# Patient Record
Sex: FEMALE | Race: WHITE | Employment: FULL TIME | URBAN - METROPOLITAN AREA
[De-identification: names, ages, dates, MRNs, and addresses within clinical notes are randomized per-mention and may not be internally consistent; named-entity substitution may affect disease eponyms.]

---

## 2018-09-17 ENCOUNTER — TELEPHONE (OUTPATIENT)
Dept: PULMONOLOGY | Age: 38
End: 2018-09-17

## 2022-02-14 ENCOUNTER — HOSPITAL ENCOUNTER (OUTPATIENT)
Age: 42
Discharge: HOME | End: 2022-02-14
Payer: COMMERCIAL

## 2022-02-14 PROCEDURE — 83550 IRON BINDING TEST: CPT

## 2022-02-14 PROCEDURE — 84443 ASSAY THYROID STIM HORMONE: CPT

## 2022-02-14 PROCEDURE — 84439 ASSAY OF FREE THYROXINE: CPT

## 2022-02-14 PROCEDURE — 83540 ASSAY OF IRON: CPT

## 2022-02-15 LAB
IRON SATURATION: 18 % (ref 15–50)
IRON: 65 UG/DL (ref 37–145)
T4 FREE: 1 NG/DL (ref 0.9–1.8)
TOTAL IRON BINDING CAPACITY: 353 UG/DL (ref 260–445)
TSH SERPL DL<=0.05 MIU/L-ACNC: 2.24 UIU/ML (ref 0.27–4.2)

## 2022-03-02 ENCOUNTER — HOSPITAL ENCOUNTER (OUTPATIENT)
Dept: NEUROLOGY | Age: 42
Discharge: HOME | End: 2022-03-02
Payer: COMMERCIAL

## 2022-03-02 PROCEDURE — 95910 NRV CNDJ TEST 7-8 STUDIES: CPT

## 2022-03-02 PROCEDURE — 95886 MUSC TEST DONE W/N TEST COMP: CPT

## 2022-03-02 NOTE — PROCEDURES
Test Date:  3/2/2022    Patient: Jacey Galvez : 1980 Physician: Nahed Prabhakar DO   Sex: Female ID#:  Ref Phys: Marcella Alba MD     Patient Complaints:  Patient is a 42year-old female who presents with numbness tingling pain in the hands Onset prior to     Patient History / Exam:  PMH: no endocrine disease. no neck or arm surgery PE: reflexes trace, normal strength    NCV & EMG Findings:  Evaluation of the left median (APB) motor nerve showed reduced amplitude (4.5 mV). The right median (APB) motor nerve showed prolonged distal onset latency (4.7 ms) and reduced amplitude (3.2 mV). The left median sensory and the right median sensory nerves showed prolonged distal peak latency (L3.7, R4.1 ms) and decreased conduction velocity (L38, R34 m/s). All remaining nerves (as indicated in the following tables) were within normal limits. All examined muscles (as indicated in the following table) showed no evidence of electrical instability. Impression: Study is consistent with bilateral carpal tunnel syndrome, right moderate severity left mild severity. No evidence of an acute radiculopathy or other entrapment neuropathy.             Nahed Prabhakar DO        Nerve Conduction Studies  Motor Nerve Results      Latency Amplitude F-Lat Segment Distance CV Comment   Site (ms) Norm (mV) Norm (ms)  (cm) (m/s) Norm    Left Median (APB) Motor   Wrist 3.7  < 4.2 4.5  > 5.0         Elbow 7.6 - 4.4 -  Elbow-Wrist 21 54  > 50    Right Median (APB) Motor   Wrist 4.7  < 4.2 3.2  > 5.0         Elbow 7.7 - 3.2 -  Elbow-Wrist 18 60  > 50    Left Ulnar (ADM) Motor   Wrist 3.4  < 4.2 4.4  > 3.0         Bel Elbow 6.4 - 3.6 -  Bel Elbow-Wrist 20 67  > 50    Abv Elbow 7.7 - 3.8 -  Abv Elbow-Bel Elbow 7 54  > 48    Right Ulnar (ADM) Motor   Wrist 3.2  < 4.2 7.4  > 3.0         Bel Elbow 6.4 - 7.0 -  Bel Elbow-Wrist 22 69  > 50    Abv Elbow 7.5 - 7.2 -  Abv Elbow-Bel Elbow 6 55  > 48      Sensory Nerve Results Latency (Peak) Amplitude (P-P) Segment Distance CV Comment   Site (ms) Norm (µV) Norm  (cm) (m/s) Norm    Left Median Sensory   Wrist-Dig II 3.7  < 3.6 47  > 10 Wrist-Dig II 14 38  > 39    Right Median Sensory   Wrist-Dig II 4.1  < 3.6 42  > 10 Wrist-Dig II 14 34  > 39    Left Ulnar Sensory   Wrist-Dig V 3.0  < 3.7 33  > 15 Wrist-Dig V 14 47  > 38    Right Ulnar Sensory   Wrist-Dig V 3.4  < 3.7 58  > 15 Wrist-Dig V 14 41  > 38        Electromyography     Side Muscle Nerve Root Ins Act Fibs Psw Amp Dur Poly Recrt Int Camilla Deems Comment   Right Deltoid Axillary C5-C6 Nml Nml Nml Nml Nml 0 Nml Nml    Right Biceps Musculocut C5-C6 Nml Nml Nml Nml Nml 0 Nml Nml    Right Triceps Radial C6-C8 Nml Nml Nml Nml Nml 0 Nml Nml    Right Brachiorad Radial C5-C6 Nml Nml Nml Nml Nml 0 Nml Nml    Right Pronator Teres Median C6-C7 Nml Nml Nml Nml Nml 0 Nml Nml    Right EIP Post Interosseous,  R... C7-C8 Nml Nml Nml Nml Nml 0 Nml Nml    Right APB Median C8-T1 Nml Nml Nml Nml Nml 0 Nml Nml    Right FDI Ulnar C8-T1 Nml Nml Nml Nml Nml 0 Nml Nml    Right Cervical Paraspinal (Uppe. .. Rami C1-C3 Nml Nml Nml         Right Cervical Paraspinal (Mid) Rami C4-C6 Nml Nml Nml         Right Cervical Paraspinal (Jose Bibb. .. Rami C7-C8 Nml Nml Nml         Left Deltoid Axillary C5-C6 Nml Nml Nml Nml Nml 0 Nml Nml    Left Biceps Musculocut C5-C6 Nml Nml Nml Nml Nml 0 Nml Nml    Left Triceps Radial C6-C8 Nml Nml Nml Nml Nml 0 Nml Nml    Left Brachiorad Radial C5-C6 Nml Nml Nml Nml Nml 0 Nml Nml    Left Pronator Teres Median C6-C7 Nml Nml Nml Nml Nml 0 Nml Nml    Left EIP Post Interosseous,  R... C7-C8 Nml Nml Nml Nml Nml 0 Nml Nml    Left APB Median C8-T1 Nml Nml Nml Nml Nml 0 Nml Nml    Left FDI Ulnar C8-T1 Nml Nml Nml Nml Nml 0 Nml Nml    Left Cervical Paraspinal (Uppe. .. Rami C1-C3 Nml Nml Nml         Left Cervical Paraspinal (Mid) Rami C4-C6 Nml Nml Nml         Left Cervical Paraspinal (Jose Bibb. ..  Rami C7-C8 Nml Nml Nml             Electronically signed by Abbi Ledesma,  on 3/2/2022 at 10:09 AM

## 2022-09-30 ENCOUNTER — OFFICE VISIT (OUTPATIENT)
Dept: ENT CLINIC | Age: 42
End: 2022-09-30
Payer: COMMERCIAL

## 2022-09-30 VITALS — HEIGHT: 64 IN | WEIGHT: 170 LBS | TEMPERATURE: 97.8 F | BODY MASS INDEX: 29.02 KG/M2 | RESPIRATION RATE: 16 BRPM

## 2022-09-30 DIAGNOSIS — G47.33 OSA (OBSTRUCTIVE SLEEP APNEA): Primary | ICD-10-CM

## 2022-09-30 DIAGNOSIS — R09.82 POST-NASAL DRAINAGE: ICD-10-CM

## 2022-09-30 PROCEDURE — 99203 OFFICE O/P NEW LOW 30 MIN: CPT | Performed by: OTOLARYNGOLOGY

## 2022-09-30 RX ORDER — CYCLOBENZAPRINE HCL 10 MG
TABLET ORAL
COMMUNITY
Start: 2022-09-28

## 2022-09-30 RX ORDER — TRAZODONE HYDROCHLORIDE 50 MG/1
TABLET ORAL
COMMUNITY
Start: 2022-09-01

## 2022-09-30 RX ORDER — ALPRAZOLAM 0.5 MG/1
TABLET ORAL
COMMUNITY

## 2022-09-30 RX ORDER — METHYLPREDNISOLONE 4 MG/1
TABLET ORAL
COMMUNITY
Start: 2022-09-28

## 2022-09-30 RX ORDER — VALACYCLOVIR HYDROCHLORIDE 1 G/1
TABLET, FILM COATED ORAL
COMMUNITY
Start: 2022-07-29

## 2022-09-30 RX ORDER — BUPROPION HYDROCHLORIDE 300 MG/1
TABLET ORAL
COMMUNITY
Start: 2022-07-16

## 2022-09-30 RX ORDER — DICLOFENAC SODIUM 75 MG/1
TABLET, DELAYED RELEASE ORAL
COMMUNITY
Start: 2022-09-28

## 2022-09-30 RX ORDER — BREXPIPRAZOLE 1 MG/1
TABLET ORAL
COMMUNITY
Start: 2022-09-28

## 2022-09-30 ASSESSMENT — ENCOUNTER SYMPTOMS
EYE ITCHING: 0
FACIAL SWELLING: 0
VOICE CHANGE: 0
SINUS PRESSURE: 0
COUGH: 0
SORE THROAT: 0
TROUBLE SWALLOWING: 0
APNEA: 0
SHORTNESS OF BREATH: 0

## 2022-09-30 NOTE — PROGRESS NOTES
Doug Dominguez [address], [address], [address] [place] [address]  P: [phone]       Patient     Junita Nissen  1980    ChiefComplaint     Chief Complaint   Patient presents with    New Patient     Patient here to today to discuss Inspire- States that she has had tonsillectomy, and sinus surgery that she is concerned may still have issues. History of Present Illness     Lin Hillman is a 25-year-old female here today for evaluation of obstructive sleep apnea and discussion of inspire. Notes moderate obstructive sleep apnea on sleep study done 6 months ago AHI of 19. Previous sleep study the year prior showed an AHI of 30. Had a CPAP titration study which showed she was fully treated but states she does not feel any benefit from using her CPAP. Significant fatigue states it is frequently falling off and overall she feels mildly claustrophobic with in place. Is seeking alternative. Past Medical History     History reviewed. No pertinent past medical history. Past Surgical History     Past Surgical History:   Procedure Laterality Date    SINUS SURGERY      TONSILLECTOMY         Family History     History reviewed. No pertinent family history.     Social History     Social History     Tobacco Use    Smoking status: Some Days     Packs/day: 0.25     Types: Cigarettes    Smokeless tobacco: Never    Tobacco comments:     Smokes about a pack every two weeks   Vaping Use    Vaping Use: Never used   Substance Use Topics    Alcohol use: Yes     Comment: Socially    Drug use: Never        Allergies     No Known Allergies    Medications     Current Outpatient Medications   Medication Sig Dispense Refill    buPROPion (WELLBUTRIN XL) 300 MG extended release tablet TAKE 1 TABLET BY MOUTH EVERY DAY      REXULTI 1 MG TABS tablet       methylPREDNISolone (MEDROL DOSEPACK) 4 MG tablet       diclofenac (VOLTAREN) 75 MG EC tablet       ALPRAZolam (XANAX) 0.5 MG tablet alprazolam 0.5 mg tablet   TAKE Mouth/Throat:      Lips: Pink. Mouth: Mucous membranes are moist.      Tongue: No lesions. Palate: No mass. Pharynx: Uvula midline. Comments: Status post tonsillectomy  Eyes:      Pupils: Pupils are equal, round, and reactive to light. Neck:      Thyroid: No thyroid mass or thyromegaly. Trachea: Trachea and phonation normal.   Cardiovascular:      Pulses: Normal pulses. Pulmonary:      Effort: Pulmonary effort is normal. No accessory muscle usage or respiratory distress. Breath sounds: No stridor. Musculoskeletal:      Cervical back: Full passive range of motion without pain. Lymphadenopathy:      Head:      Right side of head: No submental or submandibular adenopathy. Left side of head: No submental or submandibular adenopathy. Cervical: No cervical adenopathy. Right cervical: No superficial, deep or posterior cervical adenopathy. Left cervical: No superficial, deep or posterior cervical adenopathy. Skin:     General: Skin is warm and dry. Neurological:      Mental Status: She is alert and oriented to person, place, and time. Cranial Nerves: No cranial nerve deficit. Coordination: Coordination normal.      Gait: Gait normal.   Psychiatric:         Thought Content: Thought content normal.         Assessment and Plan     1. DESTINY (obstructive sleep apnea)  -Reports AHI of 19 on sleep study 6 months ago has tried and failed CPAP for greater than 3 months, BMI 29  -Records release signed we will obtain sleep study results  -Inspire process and pathway discussed understands need for sleep endoscopy to determine if she is a candidate    2. Post-nasal drainage  -Start rinsing nose, Provided          Iliana Mullins DO  9/30/22      Portions of this note were dictated using Dragon.  There may be linguistic errors secondary to the use of this program.

## 2022-09-30 NOTE — LETTER
[facility]    Surgery Schedule Request Form: 09/30/22  [address]    DATE OF SURGERY: 11/14/2022    TIME OF SURGERY:  8:30             CONF #: ____________________       Patient Information:    Patient name: Aarti Capellan    YOB: 1980 Age/Sex:41 y.o./female    SS #:[id number]    Wt Readings from Last 1 Encounters:   09/30/22 170 lb (77.1 kg)       Telephone Information:   Mobile [phone]     Home [phone]     Surgeon & Procedure Information:     Lead surgeon: Elayne Marrero Co-Surgeon: brandt  Phone: [phone] Fax: [phone]  PCP: GERADR Salcido CNP    Diagnosis: obstructive sleep apena  G47.33    Location: [address] [place] [address]    Procedure name/CPT: drug induced sleep endoscopy   78168    Procedure length: 20 minutes Anesthesia: Endo Sedation    Special Equipment: no    Patient Status: SDS (OP)    COVID Vacs: yes / no     Primary Payor Plan: [employer]  Member ID: [id number]   [facility] [address] name: aArti Capellan    [] Implement attached clinical orders for patient.       Electronically signed by Triny Maynard DO on 9/30/2022 at 11:28 AM

## 2022-11-04 NOTE — PROGRESS NOTES
Kim Fermo    Age 42 y.o.    female    1980    MRN [id number]    11/14/2022  Arrival Time_____________  OR Time____________30 [address] Saver     Procedure(s):  DRUG INDUCED SLEEP ENDOSCOPY                      Monitor Anesthesia Care    Surgeon(s):  Wayne Dixon, DO       Phone [phone] ([place])     [address]  Cell         Work  _____________________________________________________________________  _____________________________________________________________________  _____________________________________________________________________  _____________________________________________________________________  _____________________________________________________________________    PCP _____________________________ Phone_________________     H&P__________________Bringing      Chart            Epic   DOS      Called________  EKG__________________Bringing      Chart            Epic   DOS      Called________  LAB__________________ Bringing      Chart            Epic   DOS      Called________  Cardiac Clearance_______Bringing      Chart            Epic      DOS      Called________    Cardiologist________________________ Phone___________________________    ? Baptism concerns / Waiver on Chart            [employer]________________  ? Pre-op Instructions Given [place]          _________________________________  ? Directions to Surgery Center                          _________________________________  ? Transportation Home_______________      __________________________________  ?  Crutches/Walker__________________        __________________________________    ________Pre-op Orders   _______Transcribed    _______Wt.  ________Pharmacy          _______SCD  ______VTE     ______TED Rudolfo Speaker  _______  Surgery Consent    _______  Anesthesia Consent         COVID DATE______________LOCATION________________ RESULT__________

## 2022-11-09 NOTE — PROGRESS NOTES

## 2022-11-09 NOTE — PROGRESS NOTES
Obstructive Sleep Apnea (DESTINY) Screening     Patient:  Juliet Hook    YOB: 1980      Medical Record #:  [id number]                     Date:  11/9/2022     1. Are you a loud and/or regular snorer? []  Yes       [x] No    2. Have you been observed to gasp or stop breathing during sleep? []  Yes       [x] No    3. Do you feel tired or groggy upon awakening or do you awaken with a headache?           []  Yes       [x] No    4. Are you often tired or fatigued during the wake time hours? []  Yes       [x] No    5. Do you fall asleep sitting, reading, watching TV or driving? []  Yes       [] No    6. Do you often have problems with memory or concentration? []  Yes       [] No    **If patient's score is ? 3 they are considered high risk for DESTINY. An Anesthesia provider will evaluate the patient and develop a plan of care the day of surgery. Note:  If the patient's BMI is more than 35 kg m¯² , has neck circumference > 40 cm, and/or high blood pressure the risk is greater (© American Sleep Apnea Association, 2006).

## 2022-11-11 ENCOUNTER — ANESTHESIA EVENT (OUTPATIENT)
Dept: OPERATING ROOM | Age: 42
End: 2022-11-11
Payer: COMMERCIAL

## 2022-11-14 ENCOUNTER — HOSPITAL ENCOUNTER (OUTPATIENT)
Age: 42
Setting detail: OUTPATIENT SURGERY
Discharge: HOME | End: 2022-11-14
Attending: OTOLARYNGOLOGY | Admitting: OTOLARYNGOLOGY
Payer: COMMERCIAL

## 2022-11-14 ENCOUNTER — ANESTHESIA (OUTPATIENT)
Dept: OPERATING ROOM | Age: 42
End: 2022-11-14
Payer: COMMERCIAL

## 2022-11-14 VITALS
BODY MASS INDEX: 29.02 KG/M2 | TEMPERATURE: 97 F | HEIGHT: 64 IN | OXYGEN SATURATION: 97 % | SYSTOLIC BLOOD PRESSURE: 119 MMHG | DIASTOLIC BLOOD PRESSURE: 81 MMHG | RESPIRATION RATE: 16 BRPM | HEART RATE: 75 BPM | WEIGHT: 170 LBS

## 2022-11-14 PROBLEM — G47.33 OBSTRUCTIVE SLEEP APNEA (ADULT) (PEDIATRIC): Status: ACTIVE | Noted: 2022-11-14

## 2022-11-14 LAB — PREGNANCY, URINE: NEGATIVE

## 2022-11-14 PROCEDURE — 3600000013 HC SURGERY LEVEL 3 ADDTL 15MIN: Performed by: OTOLARYNGOLOGY

## 2022-11-14 PROCEDURE — 3600000003 HC SURGERY LEVEL 3 BASE: Performed by: OTOLARYNGOLOGY

## 2022-11-14 PROCEDURE — 6370000000 HC RX 637 (ALT 250 FOR IP): Performed by: OTOLARYNGOLOGY

## 2022-11-14 PROCEDURE — 2580000003 HC RX 258

## 2022-11-14 PROCEDURE — 3700000000 HC ANESTHESIA ATTENDED CARE: Performed by: OTOLARYNGOLOGY

## 2022-11-14 PROCEDURE — 2500000003 HC RX 250 WO HCPCS: Performed by: OTOLARYNGOLOGY

## 2022-11-14 PROCEDURE — 6360000002 HC RX W HCPCS

## 2022-11-14 PROCEDURE — 3700000001 HC ADD 15 MINUTES (ANESTHESIA): Performed by: OTOLARYNGOLOGY

## 2022-11-14 PROCEDURE — 84703 CHORIONIC GONADOTROPIN ASSAY: CPT

## 2022-11-14 PROCEDURE — 2580000003 HC RX 258: Performed by: ANESTHESIOLOGY

## 2022-11-14 PROCEDURE — 2709999900 HC NON-CHARGEABLE SUPPLY: Performed by: OTOLARYNGOLOGY

## 2022-11-14 PROCEDURE — 42975 DISE EVAL SLP DO BRTH FLX DX: CPT | Performed by: OTOLARYNGOLOGY

## 2022-11-14 PROCEDURE — 7100000010 HC PHASE II RECOVERY - FIRST 15 MIN: Performed by: OTOLARYNGOLOGY

## 2022-11-14 PROCEDURE — 7100000011 HC PHASE II RECOVERY - ADDTL 15 MIN: Performed by: OTOLARYNGOLOGY

## 2022-11-14 RX ORDER — SODIUM CHLORIDE 0.9 % (FLUSH) 0.9 %
5-40 SYRINGE (ML) INJECTION EVERY 12 HOURS SCHEDULED
Status: DISCONTINUED | OUTPATIENT
Start: 2022-11-14 | End: 2022-11-14 | Stop reason: HOSPADM

## 2022-11-14 RX ORDER — ONDANSETRON 2 MG/ML
4 INJECTION INTRAMUSCULAR; INTRAVENOUS
Status: DISCONTINUED | OUTPATIENT
Start: 2022-11-14 | End: 2022-11-14 | Stop reason: HOSPADM

## 2022-11-14 RX ORDER — LABETALOL HYDROCHLORIDE 5 MG/ML
10 INJECTION, SOLUTION INTRAVENOUS
Status: DISCONTINUED | OUTPATIENT
Start: 2022-11-14 | End: 2022-11-14 | Stop reason: HOSPADM

## 2022-11-14 RX ORDER — SODIUM CHLORIDE, SODIUM LACTATE, POTASSIUM CHLORIDE, CALCIUM CHLORIDE 600; 310; 30; 20 MG/100ML; MG/100ML; MG/100ML; MG/100ML
INJECTION, SOLUTION INTRAVENOUS CONTINUOUS PRN
Status: DISCONTINUED | OUTPATIENT
Start: 2022-11-14 | End: 2022-11-14 | Stop reason: SDUPTHER

## 2022-11-14 RX ORDER — OXYCODONE HYDROCHLORIDE 5 MG/1
10 TABLET ORAL PRN
Status: DISCONTINUED | OUTPATIENT
Start: 2022-11-14 | End: 2022-11-14 | Stop reason: HOSPADM

## 2022-11-14 RX ORDER — SODIUM CHLORIDE 0.9 % (FLUSH) 0.9 %
5-40 SYRINGE (ML) INJECTION PRN
Status: DISCONTINUED | OUTPATIENT
Start: 2022-11-14 | End: 2022-11-14 | Stop reason: HOSPADM

## 2022-11-14 RX ORDER — MEPERIDINE HYDROCHLORIDE 50 MG/ML
12.5 INJECTION INTRAMUSCULAR; INTRAVENOUS; SUBCUTANEOUS EVERY 5 MIN PRN
Status: DISCONTINUED | OUTPATIENT
Start: 2022-11-14 | End: 2022-11-14 | Stop reason: HOSPADM

## 2022-11-14 RX ORDER — OXYMETAZOLINE HYDROCHLORIDE 0.05 G/100ML
SPRAY NASAL PRN
Status: DISCONTINUED | OUTPATIENT
Start: 2022-11-14 | End: 2022-11-14 | Stop reason: ALTCHOICE

## 2022-11-14 RX ORDER — LIDOCAINE HYDROCHLORIDE 40 MG/ML
INJECTION, SOLUTION RETROBULBAR; TOPICAL PRN
Status: DISCONTINUED | OUTPATIENT
Start: 2022-11-14 | End: 2022-11-14 | Stop reason: ALTCHOICE

## 2022-11-14 RX ORDER — DIPHENHYDRAMINE HYDROCHLORIDE 50 MG/ML
12.5 INJECTION INTRAMUSCULAR; INTRAVENOUS
Status: DISCONTINUED | OUTPATIENT
Start: 2022-11-14 | End: 2022-11-14 | Stop reason: HOSPADM

## 2022-11-14 RX ORDER — PROPOFOL 10 MG/ML
INJECTION, EMULSION INTRAVENOUS CONTINUOUS PRN
Status: DISCONTINUED | OUTPATIENT
Start: 2022-11-14 | End: 2022-11-14 | Stop reason: SDUPTHER

## 2022-11-14 RX ORDER — OXYCODONE HYDROCHLORIDE 5 MG/1
5 TABLET ORAL PRN
Status: DISCONTINUED | OUTPATIENT
Start: 2022-11-14 | End: 2022-11-14 | Stop reason: HOSPADM

## 2022-11-14 RX ORDER — SODIUM CHLORIDE 9 MG/ML
INJECTION, SOLUTION INTRAVENOUS PRN
Status: DISCONTINUED | OUTPATIENT
Start: 2022-11-14 | End: 2022-11-14 | Stop reason: HOSPADM

## 2022-11-14 RX ORDER — SODIUM CHLORIDE, SODIUM LACTATE, POTASSIUM CHLORIDE, CALCIUM CHLORIDE 600; 310; 30; 20 MG/100ML; MG/100ML; MG/100ML; MG/100ML
INJECTION, SOLUTION INTRAVENOUS CONTINUOUS
Status: DISCONTINUED | OUTPATIENT
Start: 2022-11-14 | End: 2022-11-14 | Stop reason: HOSPADM

## 2022-11-14 RX ORDER — SODIUM CHLORIDE 9 MG/ML
25 INJECTION, SOLUTION INTRAVENOUS PRN
Status: DISCONTINUED | OUTPATIENT
Start: 2022-11-14 | End: 2022-11-14 | Stop reason: HOSPADM

## 2022-11-14 RX ADMIN — PROPOFOL 10 MG: 10 INJECTION, EMULSION INTRAVENOUS at 08:30

## 2022-11-14 RX ADMIN — SODIUM CHLORIDE, POTASSIUM CHLORIDE, SODIUM LACTATE AND CALCIUM CHLORIDE: 600; 310; 30; 20 INJECTION, SOLUTION INTRAVENOUS at 06:49

## 2022-11-14 RX ADMIN — SODIUM CHLORIDE, SODIUM LACTATE, POTASSIUM CHLORIDE, AND CALCIUM CHLORIDE: .6; .31; .03; .02 INJECTION, SOLUTION INTRAVENOUS at 08:18

## 2022-11-14 RX ADMIN — PROPOFOL 10 MG: 10 INJECTION, EMULSION INTRAVENOUS at 08:24

## 2022-11-14 RX ADMIN — PROPOFOL 100 MCG/KG/MIN: 10 INJECTION, EMULSION INTRAVENOUS at 08:18

## 2022-11-14 ASSESSMENT — LIFESTYLE VARIABLES: SMOKING_STATUS: 1

## 2022-11-14 ASSESSMENT — PAIN - FUNCTIONAL ASSESSMENT: PAIN_FUNCTIONAL_ASSESSMENT: 0-10

## 2022-11-14 NOTE — DISCHARGE INSTRUCTIONS
Post Op DISE Instructions    General Anesthesia or Sedation   · Do not drive or operate heavy machinery for 24 hours. · Do not consume alcohol, tranquilizers, sleeping medications, or any non-prescribed medications for 24 hours. · Do not make important decisions or sign any important papers in the next 24 hours. · You should have someone with you tonight at home. · You may appear flushed for several hours after surgery   Activity   You are advised to go directly home from the hospital.  Resume light to normal activity today  Fluids and Diet   · Begin with clear liquids, bouillon, dry toast, soda crackers. If not nauseated, you may go to a regular diet when you desire. Greasy and spicy foods are not advised after anesthesia   Medications   · You may resume your daily prescription medication schedule   Follow up care   Call your surgeon if you have any problem that concerns you. After hours, you can reach your physician through his/her answering service. If you need IMMEDIATE ATTENTION, come to [address] [place] [address]. You are a candidate for inspire. Call the office to set up a visit to discuss next steps and details of implantation. [phone]          ANESTHESIA DISCHARGE INSTRUCTIONS    You are under the influence of drugs- do not drink alcohol, drive a car, operate machinery(such as power tools, kitchen appliances, etc), sign legal documents, or make any important decisions for 24 hours (or while on pain medications). Children should not ride bikes or [employer] or play on gym sets  for 24 hours after surgery. A responsible adult should be with you for 24 hours. Rest at home today- increase activity as tolerated. Progress slowly to a regular diet unless your physician has instructed you otherwise. Drink plenty of water.     CALL YOUR DOCTOR IF YOU:  Have moderate to severe nausea or vomiting AND are unable to hold down fluids or prescribed medications. Have bright red bloody drainage from your dressing that won't stop oozing. Do not get relief with your pain medication    NORMAL (POSSIBLE) SIDE EFFECTS FROM ANESTHESIA:     Confusion, temporary memory loss, delayed reaction times in the first 24 hours  Lightheadedness, dizziness, difficulty focusing, blurred vision  Nausea/vomiting can happen  Shivering, feeling cold, sore throat, cough and muscle aches should stop within 24-48 hours  Trouble urinating - call your surgeon if it has been more than 8 hrs  Bruising or soreness at the IV site - call if it remains red, firm or there is drainage             FEMALES OF CHILDBEARING AGE WHO ARE TAKING BIRTH CONTROL PILLS:  You may have received a medication during your procedure that interferes with the   actions of birth control pills (Bridion or Emend). Use some other kind of birth control in addition to your pills, like a condom, for 1 month after your procedure to prevent unwanted pregnancy. The following instructions are to be followed if you have a known history or diagnosis of sleep apnea: For all sleep apnea patients:  ? Sleep on your side or sitting up in a chair whenever possible, especially the first 24 hours after surgery. ? Use only medicines prescribed by your doctor. ? Do not drink alcohol. ? If you have a dental device to assist you while at rest, use it at all times for the first 24 hours. For patients using CPAP machines:  ? Use your CPAP machine during all periods of sleep as usual.  ? Use your CPAP machine during all periods of daytime rest while on pain medicines. ** Follow up with your primary care doctor for continued care. IF YOU DO NOT TAKE ALL OF YOUR NARCOTIC PAIN MEDICATION, please dispose of them responsibly. There are drop off boxes in the Emergency Departments 24/7 at both [facility] and [facility].  If these locations are not convenient, other options for discarding them can be found at:  [email or web address]    Hospital or office staff may NOT accept any medications to drop off in the cabinet for you. What is a Surgical Site Infection or  (SSI)? A surgical site infection (SSI) is an infection that occurs after surgery in the part of the body where the surgery took place. Most patients who have surgery do not develop an infection. However, infections can develop in about 1-3 cases for every 100 patients who have had surgery. Our goal is for you to NOT experience any complications and be completely satisfied with your care! However, some signs or symptoms to look for and report immediately to your doctor are:   1. Fever above 101 degrees    2. Redness and increasing pain around the area  where you had surgery   3. Drainage of cloudy fluid or pus coming from the surgical area    Some of the things we/ you can do to prevent SSI's are:   1. Clean hands with soap and water or an alcohol-based hand rub before and after caring for the operative area. This occurs the day of surgery and for the next 2 weeks. 2.Sometimes you receive an appropriate antibiotic within 60 minutes before your surgery or take one for several days after surgery depending on your surgeon's instructions and/or the type of surgery you are having. 3. Family and/or friends who visit you should NOT touch the surgical wound or dressings until advised by your surgeon. 4. Be sure to elevate and decrease the swelling after your surgery to help prevent infection. 5. If you are a diabetic, you need to closely monitor your blood sugar levels and report any significant increases or changes to your surgeon to help promote the healing process.

## 2022-11-14 NOTE — PROGRESS NOTES
Boyfriend updated on phone. Discharge instructions reviewed with patient and responsible adult. Discharge instructions signed and copy given with no additional questions. Patient to be discharged home with belongings.

## 2022-11-14 NOTE — BRIEF OP NOTE
Brief Postoperative Note      Patient: Lidia Suazo  YOB: 1980  MRN: [id number]    Date of Procedure: 11/14/2022    Pre-Op Diagnosis: OBSTRUCTIVE SLEEP APNEA    Post-Op Diagnosis: Same       Procedure(s):  DRUG INDUCED SLEEP ENDOSCOPY    Surgeon(s):  Jose Eduardo Leone DO    Assistant:  Surgical Assistant: Felicia Russell    Anesthesia: Monitor Anesthesia Care    Estimated Blood Loss (mL): Minimal    Complications: None    Specimens:   * No specimens in log *    Implants:  * No implants in log *      Drains: * No LDAs found *    Findings: anterior posterior collapse    Electronically signed by Jose Eduardo Leone DO on 11/14/2022 at 8:35 AM

## 2022-11-14 NOTE — OP NOTE
[address] [facility] SURGERY  OPERATIVE REPORT    Patient Name: Kim Osullivan  YOB: 1980  Medical Record Number:  [id number]  Billing Number:  [id number]  Date of Procedure: 11/14/22  Time: 0830    Pre Operative Diagnoses: Obstructive Sleep Apnea  Post Operative Diagnoses:  Obstructive Sleep Apnea           Procedure:  1. Drug Induced Sleep endoscopy (06012)       Surgeon: Wayne Dixon DO    OR Staff/ Assistant:  Circulator: Jairon Fleming RN; Curtistine Habermann, RN  Surgical Assistant: Aishwarya Devlin  Scrub Person First: [facility]    Anesthesia:  Sedation     Findings:  1) anterior posterior collapse    Indications: This is a 42 y.o. female with history of moderate to severe symptomatic obstructive sleep apnea, who is intolerant unable to achieve benefit with positive pressure therapy, presents today for drug-induced sleep endoscopy to better characterize her locations and pattern of obstruction to predict appropriate medical and/or surgical options moving forward. Risks and benefits discussed with the patient including alternate treatment options, Informed consent was obtained, the patient elected to proceed with the planned procedure. DETAILS OF PROCEDURE(S):       The patient was brought to the operating room and was anesthetized via the standard drug-induced sleep endoscopy protocol. The propofol infusion rate was started at 100 MCG and increase gradually to level at which conditions that mimic sleep were gradually observed. With the patient unresponsive to verbal commands, but still with spontaneous respiration, sleep disordered breathing events and associated desaturations were clearly observed. Under these conditions, flexible endoscope was inserted to examine both sides of the nose as well as the pharynx. The nose was relatively unremarkable. The retropatellar space showed a more obliquely oriented palate.   A mild lateral wall component was noted, but the palatal collapse was primarily an anterior posterior fashion. More distally, mild lateral oropharyngeal wall component was noted, but again no complete lateral oropharyngeal collapse. In the hypopharynx, a very large, tongue base is observed in complete anterior-posterior retrolingual/retroepiglottic obstruction. In summary, there is no evidence of complete concentric palatal obstruction and does appear to be a candidate anatomically for hypoglossal nerve stimulation therapy. I was present for and performed the entire procedure. Estimated Blood Loss: 0mL                 Specimens: None           Complications: There were no complications.     Rudolph Zambrano DO

## 2022-11-14 NOTE — H&P
Doug Dominguez 89, [address], [address] [facility]  P: [phone]        Patient      Leonard Kidd  1980     ChiefComplaint           Chief Complaint   Patient presents with    New Patient       Patient here to today to discuss Inspire- States that she has had tonsillectomy, and sinus surgery that she is concerned may still have issues. History of Present Illness      Ingrid Askew is a 41-year-old female here today for sleep endoscopy. Past Medical History      Past Medical History   History reviewed. No pertinent past medical history. Past Surgical History      Past Surgical History         Past Surgical History:   Procedure Laterality Date    SINUS SURGERY        TONSILLECTOMY                Family History      Family History   History reviewed. No pertinent family history.         Social History      Social History            Tobacco Use    Smoking status: Some Days       Packs/day: 0.25       Types: Cigarettes    Smokeless tobacco: Never    Tobacco comments:       Smokes about a pack every two weeks   Vaping Use    Vaping Use: Never used   Substance Use Topics    Alcohol use: Yes       Comment: Socially    Drug use: Never         Allergies      No Known Allergies     Medications      Current Facility-Administered Medications          Current Outpatient Medications   Medication Sig Dispense Refill    buPROPion (WELLBUTRIN XL) 300 MG extended release tablet TAKE 1 TABLET BY MOUTH EVERY DAY        REXULTI 1 MG TABS tablet          methylPREDNISolone (MEDROL DOSEPACK) 4 MG tablet          diclofenac (VOLTAREN) 75 MG EC tablet          ALPRAZolam (XANAX) 0.5 MG tablet alprazolam 0.5 mg tablet   TAKE 1 TABLET BY MOUTH 3 TIMES A DAY        cyclobenzaprine (FLEXERIL) 10 MG tablet          traZODone (DESYREL) 50 MG tablet TAKE 1 TABLET BY MOUTH EVERY DAY AT BEDTIME FOR 30 DAYS        valACYclovir (VALTREX) 1 g tablet TAKE 1 TABLET BY MOUTH TWICE A DAY FOR 5 DAYS No current facility-administered medications for this visit. Review of Systems      Review of Systems   Constitutional:  Positive for fatigue. Negative for appetite change, chills, fever and unexpected weight change. HENT:  Positive for postnasal drip. Negative for congestion, ear discharge, ear pain, facial swelling, hearing loss, nosebleeds, sinus pressure, sneezing, sore throat, tinnitus, trouble swallowing and voice change. Eyes:  Negative for itching. Respiratory:  Negative for apnea, cough and shortness of breath. Endocrine: Negative for cold intolerance and heat intolerance. Musculoskeletal:  Negative for myalgias and neck pain. Skin:  Negative for rash. Allergic/Immunologic: Negative for environmental allergies. Neurological:  Negative for dizziness and headaches. Psychiatric/Behavioral:  Negative for confusion, decreased concentration and sleep disturbance. PhysicalExam      Vitals       Vitals:     09/30/22 1052   Resp: 16   Temp: 97.8 °F (36.6 °C)   TempSrc: Infrared   Weight: 170 lb (77.1 kg)   Height: 5' 4\" (1.626 m)            Physical Exam  Constitutional:       General: She is not in acute distress. Appearance: She is well-developed. HENT:      Head: Normocephalic and atraumatic. Right Ear: Tympanic membrane, ear canal and external ear normal. No drainage. No middle ear effusion. Tympanic membrane is not bulging. Tympanic membrane has normal mobility. Left Ear: Tympanic membrane, ear canal and external ear normal. No drainage. No middle ear effusion. Tympanic membrane is not bulging. Tympanic membrane has normal mobility. Nose: No mucosal edema or rhinorrhea. Comments: Crusting anteriorly bilaterally     Mouth/Throat:      Lips: Pink. Mouth: Mucous membranes are moist.      Tongue: No lesions. Palate: No mass. Pharynx: Uvula midline.       Comments: Status post tonsillectomy  Eyes:      Pupils: Pupils are equal, round, and reactive to light. Neck:      Thyroid: No thyroid mass or thyromegaly. Trachea: Trachea and phonation normal.   Cardiovascular:      Pulses: Normal pulses. Pulmonary:      Effort: Pulmonary effort is normal. No accessory muscle usage or respiratory distress. Breath sounds: No stridor. Musculoskeletal:      Cervical back: Full passive range of motion without pain. Lymphadenopathy:      Head:      Right side of head: No submental or submandibular adenopathy. Left side of head: No submental or submandibular adenopathy. Cervical: No cervical adenopathy. Right cervical: No superficial, deep or posterior cervical adenopathy. Left cervical: No superficial, deep or posterior cervical adenopathy. Skin:     General: Skin is warm and dry. Neurological:      Mental Status: She is alert and oriented to person, place, and time. Cranial Nerves: No cranial nerve deficit. Coordination: Coordination normal.      Gait: Gait normal.   Psychiatric:         Thought Content: Thought content normal.            Assessment and Plan      1.  DESTINY (obstructive sleep apnea)  -to OR for sleep endoscopy             Anthony Potts DO

## 2022-11-14 NOTE — ANESTHESIA POSTPROCEDURE EVALUATION
Department of Anesthesiology  Postprocedure Note    Patient: Yu Staton  MRN: [id number]  YOB: 1980  Date of evaluation: 11/14/2022      Procedure Summary     Date: 11/14/22 Room / Location: [address] Giselle Doevard [address] [place] [address]    Anesthesia Start: Foster Tyrrell Anesthesia Stop: 0840    Procedure: DRUG INDUCED SLEEP ENDOSCOPY (Throat) Diagnosis:       Obstructive sleep apnea (adult) (pediatric)      (OBSTRUCTIVE SLEEP APNEA)    Surgeons: Jerman Bejarano DO Responsible Provider: Ewelina Zaragoza MD    Anesthesia Type: MAC ASA Status: 3          Anesthesia Type: No value filed.     Celestina Phase I: Celestina Score: 10    Celestina Phase II: Celestina Score: 10      Anesthesia Post Evaluation    Patient location during evaluation: PACU  Patient participation: complete - patient participated  Level of consciousness: awake and alert  Pain score: 0  Airway patency: patent  Nausea & Vomiting: no nausea and no vomiting  Complications: no  Cardiovascular status: blood pressure returned to baseline  Respiratory status: acceptable  Hydration status: stable

## 2022-11-14 NOTE — ANESTHESIA PRE PROCEDURE
Department of Anesthesiology  Preprocedure Note       Name:  Lee Kwon   Age:  42 y.o.  :  1980                                          MRN:  [id number]         Date:  2022      Surgeon: Li Young):  Josiah Leon DO    Procedure: Procedure(s):  DRUG INDUCED SLEEP ENDOSCOPY    Medications prior to admission:   Prior to Admission medications    Medication Sig Start Date End Date Taking? Authorizing Provider   buPROPion (WELLBUTRIN XL) 300 MG extended release tablet TAKE 1 TABLET BY MOUTH EVERY DAY 22   Historical Provider, MD   REXULTI 1 MG TABS tablet Take 1 mg by mouth daily 22   Historical Provider, MD   ALPRAZolam (XANAX) 0.5 MG tablet alprazolam 0.5 mg tablet   TAKE 1 TABLET BY MOUTH 3 TIMES A DAY    Historical Provider, MD   cyclobenzaprine (FLEXERIL) 10 MG tablet Take 10 mg by mouth at bedtime 22   Historical Provider, MD   traZODone (DESYREL) 50 MG tablet TAKE 1 TABLET BY MOUTH EVERY DAY AT BEDTIME FOR 30 DAYS 22   Historical Provider, MD   valACYclovir (VALTREX) 1 g tablet TAKE 1 TABLET BY MOUTH TWICE A DAY FOR 5 DAYS 22   Historical Provider, MD       Current medications:    Current Facility-Administered Medications   Medication Dose Route Frequency Provider Last Rate Last Admin    lactated ringers infusion   IntraVENous Continuous Dallas Snyder  mL/hr at 22 0649 New Bag at 22 0649    sodium chloride flush 0.9 % injection 5-40 mL  5-40 mL IntraVENous 2 times per day Dallas Snyder MD        sodium chloride flush 0.9 % injection 5-40 mL  5-40 mL IntraVENous PRN Dallas Snyder MD        0.9 % sodium chloride infusion   IntraVENous PRN Dallas Snyder MD           Allergies:  No Known Allergies    Problem List:  There is no problem list on file for this patient.       Past Medical History:        Diagnosis Date    Anxiety     Depression     Sleep apnea        Past Surgical History:        Procedure Laterality Date    SINUS SURGERY      TONSILLECTOMY         Social History:    Social History     Tobacco Use    Smoking status: Some Days     Packs/day: 0.25     Types: Cigarettes    Smokeless tobacco: Never    Tobacco comments:     Smokes about a pack every two weeks   Substance Use Topics    Alcohol use: Yes     Alcohol/week: 3.0 standard drinks     Types: 3 Cans of beer per week     Comment: Socially                                Ready to quit: Not Answered  Counseling given: Not Answered  Tobacco comments: Smokes about a pack every two weeks      Vital Signs (Current):   Vitals:    11/09/22 1342 11/14/22 0645   BP:  108/70   Pulse:  78   Resp:  16   Temp:  96.9 °F (36.1 °C)   SpO2:  99%   Weight: 170 lb (77.1 kg)    Height: 5' 4\" (1.626 m)                                               BP Readings from Last 3 Encounters:   11/14/22 108/70       NPO Status: Time of last liquid consumption: 2300                        Time of last solid consumption: 2300                        Date of last liquid consumption: 11/13/22                        Date of last solid food consumption: 11/13/22    BMI:   Wt Readings from Last 3 Encounters:   11/09/22 170 lb (77.1 kg)   09/30/22 170 lb (77.1 kg)     Body mass index is 29.18 kg/m². CBC: No results found for: WBC, RBC, HGB, HCT, MCV, RDW, PLT    CMP: No results found for: NA, K, CL, CO2, BUN, CREATININE, GFRAA, AGRATIO, LABGLOM, GLUCOSE, GLU, PROT, CALCIUM, BILITOT, ALKPHOS, AST, ALT    POC Tests: No results for input(s): POCGLU, POCNA, POCK, POCCL, POCBUN, POCHEMO, POCHCT in the last 72 hours.     Coags: No results found for: PROTIME, INR, APTT    HCG (If Applicable):   Lab Results   Component Value Date    PREGTESTUR Negative 11/14/2022        ABGs: No results found for: PHART, PO2ART, DFM3NOM, JMA4VEX, BEART, I8LHEVUP     Type & Screen (If Applicable):  No results found for: LABABO, LABRH    Drug/Infectious Status (If Applicable):  No results found for: HIV, HEPCAB    COVID-19 Screening (If Applicable): No results found for: COVID19        Anesthesia Evaluation    Airway: Mallampati: III  TM distance: >3 FB   Neck ROM: full  Mouth opening: > = 3 FB   Dental: normal exam         Pulmonary:   (+) sleep apnea:  current smoker                           Cardiovascular:Negative CV ROS                      Neuro/Psych:   (+) psychiatric history:depression/anxiety             GI/Hepatic/Renal: Neg GI/Hepatic/Renal ROS            Endo/Other: Negative Endo/Other ROS                    Abdominal:             Vascular: Other Findings:           Anesthesia Plan      MAC     ASA 3     (Risks, benefits and alternatives of MAC anesthesia discussed with pt. Questions answered. Willing to proceed.)  Induction: intravenous. Anesthetic plan and risks discussed with patient.                         Angie Cuadra MD   11/14/2022

## 2022-11-15 ENCOUNTER — TELEPHONE (OUTPATIENT)
Dept: ENT CLINIC | Age: 42
End: 2022-11-15

## 2022-11-16 ENCOUNTER — SCHEDULED TELEPHONE ENCOUNTER (OUTPATIENT)
Dept: ENT CLINIC | Age: 42
End: 2022-11-16
Payer: COMMERCIAL

## 2022-11-16 DIAGNOSIS — G47.33 OBSTRUCTIVE SLEEP APNEA (ADULT) (PEDIATRIC): Primary | ICD-10-CM

## 2022-11-16 PROCEDURE — 99442 PR PHYS/QHP TELEPHONE EVALUATION 11-20 MIN: CPT | Performed by: OTOLARYNGOLOGY

## 2022-11-16 NOTE — PROGRESS NOTES
Dom Morales is a 42 y.o. female evaluated via telephone on 11/16/2022 for Sleep Apnea  . Documentation:  I communicated with the patient and/or health care decision maker about moderate sleep apnea AHI 19 on sleep study 6/16/2022, has tried failed CPAP for greater than 3 months secondary to it frequently falling off, leaking and feeling claustrophobic with mask in place. Has tried Xanax for improvement as tolerates did not improve symptoms. Has had multiple adjustments to machine and mask without improvement in tolerance cannot tolerate for more than 4 hours per night. Has previous tonsillectomy. Underlying medical conditions. BMI 29. Anterior posterior collapse on sleep endoscopy. Details of this discussion including any medical advice provided: Inspire placement discussed in detail with patient. Surgical discussion including risks explained. Risk discussed for risk of pneumothorax, postop infection requiring explantation and neuropraxia which could result in functional deficit with difficulty in speech, chewing and nonfunctional implant. Post placement pathway discussed in detail, understands implant would not be activated for 1 month and this will be done by Dr. Mike Darling. All questions answered wishes to proceed    Total Time: minutes: 11-20 minutes    Dom Morales was evaluated through a synchronous (real-time) audio encounter. Patient identification was verified at the start of the visit. She (or guardian if applicable) is aware that this is a billable service, which includes applicable co-pays. This visit was conducted with the patient's (and/or legal guardian's) verbal consent. She has not had a related appointment within my department in the past 7 days or scheduled within the next 24 hours. The patient was located at Home: [place] [address]. The provider was located at [facility] ([address] [place] [address]): [address] [place] [address] Dr Tsai ALEXUS [facility].     Note: not billable if this call serves to triage the patient into an appointment for the relevant concern    Love Rice, DO

## 2022-12-06 ENCOUNTER — TELEPHONE (OUTPATIENT)
Dept: ENT CLINIC | Age: 42
End: 2022-12-06

## 2022-12-06 NOTE — TELEPHONE ENCOUNTER
Spoke with patient and advised her Drew Marina has been approved by [employer] [facility] (R). She will call to schedule.

## 2022-12-12 ENCOUNTER — TELEPHONE (OUTPATIENT)
Dept: ENT CLINIC | Age: 42
End: 2022-12-12

## 2022-12-13 NOTE — PROGRESS NOTES
Delgado Fruit    Age 36 y.o.    female    1980    MRN [id number]    1/16/2023  Arrival Time_____________  OR Time____________115 Min     Procedure(s):  PLACEMENT OS INSPIRE                      General   Surgeon(s):  Dewanda Conception, DO      DAY ADMIT ___  SDS/OP ___  OUTPT IN BED ___        Phone [phone] (home)     PCP _____________________ Phone_________________ Epic ( ) Epic CE ( ) Appt ________    ADDITIONAL INFO __________________________________ Cardio/Consult _____________    NOTES _____________________________________________________________________    ____________________________________________________________________________    PAT APPT DATE:________ TIME: ________  FAXED QAD: _______  (__) H&P w/ Hospitalist  __________________________________________________________________________  Preop Nurse phone screen complete: _____________  (__) CBC     (__) W/ DIFF ___________     (__) Hgb A1C    ___________  (__) CHEST X RAY   __________  (__) LIPID PROFILE  ___________  (__) EKG   __________  (__) PT/PTT   ___________  (__) PFT's   __________  (__) BMP   ___________  (__) CAROTIDS  __________  (__) CMP   ___________  (__) VEIN MAPPING  __________  (__) U/A   ___________  (__) HISTORY & PHYSICAL __________  (__) URINE C & S  ___________  (__) CARDIAC CLEARANCE __________  (__) U/A W/ FLEX  ___________  (__) PULM.  CLEARANCE __________  (__) SERUM PREGNANCY ___________  (__) Check Epic DOS orders __________  (__) TYPE & SCREEN __________repeat ( ) (__)  __________________ __________  (__) ALBUMIN   ___________  (__)  __________________ __________  (__) TRANSFERRIN  ___________  (__)  __________________ __________  (__) LIVER PROFILE  ___________  (__)  __________________ __________  (__) MRSA NASAL SWAB ___________  (__) URINE PREG DOS __________  (__) SED RATE  ___________  (__) BLOOD SUGAR DOS __________  (__) C-REACTIVE PROTEIN ___________    (__) VITAMIN D HYDROXY ___________  (__) BLOOD THINNERS __________    (__) ACE/ ARBS: _____________________     (__) BETABLOCKERS __________________

## 2023-01-11 RX ORDER — TRETINOIN 1 MG/G
CREAM TOPICAL NIGHTLY
COMMUNITY

## 2023-01-11 RX ORDER — HYDROXYZINE HYDROCHLORIDE 25 MG/1
25 TABLET, FILM COATED ORAL 3 TIMES DAILY PRN
COMMUNITY

## 2023-01-11 NOTE — PROGRESS NOTES
Kobe Roche   Date and time of surgery :0730  Arrival Time:0600   1. Do not eat or drink anything after 12 midnight (or  hours) prior to surgery. This includes no water, chewing gum or mints. 2. Take the following pills will a small sip of water on the morning of surgery none. 3. Aspirin, Ibuprofen, Advil, Naproxen, Vitamin E and other Anti-inflammatory products should be stopped for 5 days before surgery or as directed by your physician. 4. Check with your Doctor regarding stopping Plavix, Coumadin, Lovenox, Fragmin or other blood thinners   5. Do not smoke, and do not drink any alcoholic beverages 24 hours prior to surgery. This includes NA Beer. 6. You may brush your teeth and gargle the morning of surgery. DO NOT SWALLOW WATER.   7. You MUST make arrangements for a responsible adult to take you home after your surgery. You will not be allowed to leave alone or drive yourself home. It is strongly suggested someone stay with you the first 24 hrs. Your surgery will be cancelled if you do not have a ride home. 8. A parent/legal guardian must accompany a child scheduled for surgery and plan to stay at the hospital until the child is discharged. Please do not bring other children with you. 9. Please wear simple, loose fitting clothing to the hospital.  Mendoza Covarrubias not bring valuables (money, credit cards, checkbooks, etc.) Do not wear any makeup (including no eye makeup) or nail polish on your fingers or toes. 10. DO NOT wear any jewelry or piercings on day of surgery. All body piercing jewelry must be removed. 11. If you have dentures, they will be removed before going to the OR; we will provide you a container. If you wear contact lenses or glasses, they will be removed; please bring a case for them. 12. Please see your family doctor/pediatrician for a history & physical and/or concerning medications. Bring any test results/reports from your physician's office.    13. Remember to bring Blood Bank bracelet to the hospital on the day of surgery. 14. If you have a Living Will and Durable Power of  for Healthcare, please bring in a copy. 13. Notify your Surgeon if you develop any illness between now and surgery  time, cough, cold, fever, sore throat, nausea, vomiting, etc.  Please notify your surgeon if you experience dizziness, shortness of breath or blurred vision between now & the time of your surgery   16. DO NOT shave your operative site 96 hours prior to surgery. For face & neck surgery, men may use an electric razor 48 hours prior to surgery. 17. Shower the night before surgery with Antibacterial soap Hibiclens   18. To provide excellent care visitors will be limited to one in the room at any given time. 19.  Please bring picture ID and insurance card. 20.  Visit our web site for additional information:  [employer]/surgery. *Please call pre admission testing if you any further questions   Butch Machado         [address] [place] [phone]    [phone].  [facility]  [phone]   [address] [place] [address]

## 2023-01-13 ENCOUNTER — TELEPHONE (OUTPATIENT)
Dept: ENT CLINIC | Age: 43
End: 2023-01-13

## 2023-01-13 RX ORDER — SODIUM CHLORIDE 0.9 % (FLUSH) 0.9 %
5-40 SYRINGE (ML) INJECTION EVERY 12 HOURS SCHEDULED
OUTPATIENT
Start: 2023-01-13

## 2023-01-13 RX ORDER — SODIUM CHLORIDE 9 MG/ML
INJECTION, SOLUTION INTRAVENOUS PRN
OUTPATIENT
Start: 2023-01-13

## 2023-01-13 RX ORDER — SODIUM CHLORIDE 0.9 % (FLUSH) 0.9 %
5-40 SYRINGE (ML) INJECTION PRN
OUTPATIENT
Start: 2023-01-13

## 2023-01-13 NOTE — TELEPHONE ENCOUNTER
SPOKE WITH PATIENT AND CONFIRMED ENT SURGERY FOR 1/16/23. ADVISED HER OF TIME CHANGE.  SHE WILL CALL BACK TO SCHEDULE HER POST OP

## 2023-01-16 ENCOUNTER — APPOINTMENT (OUTPATIENT)
Dept: GENERAL RADIOLOGY | Age: 43
End: 2023-01-16
Attending: OTOLARYNGOLOGY
Payer: COMMERCIAL

## 2023-01-16 ENCOUNTER — HOSPITAL ENCOUNTER (OUTPATIENT)
Age: 43
Setting detail: OUTPATIENT SURGERY
Discharge: HOME | End: 2023-01-16
Attending: OTOLARYNGOLOGY | Admitting: OTOLARYNGOLOGY
Payer: COMMERCIAL

## 2023-01-16 ENCOUNTER — ANESTHESIA EVENT (OUTPATIENT)
Dept: OPERATING ROOM | Age: 43
End: 2023-01-16
Payer: COMMERCIAL

## 2023-01-16 ENCOUNTER — ANESTHESIA (OUTPATIENT)
Dept: OPERATING ROOM | Age: 43
End: 2023-01-16
Payer: COMMERCIAL

## 2023-01-16 VITALS
HEIGHT: 64 IN | HEART RATE: 76 BPM | SYSTOLIC BLOOD PRESSURE: 122 MMHG | OXYGEN SATURATION: 95 % | RESPIRATION RATE: 16 BRPM | DIASTOLIC BLOOD PRESSURE: 79 MMHG | BODY MASS INDEX: 27.83 KG/M2 | WEIGHT: 163 LBS | TEMPERATURE: 97.5 F

## 2023-01-16 DIAGNOSIS — G89.18 POST-OP PAIN: Primary | ICD-10-CM

## 2023-01-16 LAB
EKG ATRIAL RATE: 68 BPM
EKG DIAGNOSIS: NORMAL
EKG P AXIS: 16 DEGREES
EKG P-R INTERVAL: 130 MS
EKG Q-T INTERVAL: 416 MS
EKG QRS DURATION: 90 MS
EKG QTC CALCULATION (BAZETT): 442 MS
EKG R AXIS: -6 DEGREES
EKG T AXIS: 34 DEGREES
EKG VENTRICULAR RATE: 68 BPM
PREGNANCY, URINE: NEGATIVE

## 2023-01-16 PROCEDURE — 3600000004 HC SURGERY LEVEL 4 BASE: Performed by: OTOLARYNGOLOGY

## 2023-01-16 PROCEDURE — 70360 X-RAY EXAM OF NECK: CPT

## 2023-01-16 PROCEDURE — 93005 ELECTROCARDIOGRAM TRACING: CPT | Performed by: ANESTHESIOLOGY

## 2023-01-16 PROCEDURE — 3700000001 HC ADD 15 MINUTES (ANESTHESIA): Performed by: OTOLARYNGOLOGY

## 2023-01-16 PROCEDURE — C1767 GENERATOR, NEURO NON-RECHARG: HCPCS | Performed by: OTOLARYNGOLOGY

## 2023-01-16 PROCEDURE — 3600000014 HC SURGERY LEVEL 4 ADDTL 15MIN: Performed by: OTOLARYNGOLOGY

## 2023-01-16 PROCEDURE — 6360000002 HC RX W HCPCS: Performed by: OTOLARYNGOLOGY

## 2023-01-16 PROCEDURE — 7100000011 HC PHASE II RECOVERY - ADDTL 15 MIN: Performed by: OTOLARYNGOLOGY

## 2023-01-16 PROCEDURE — 2709999900 HC NON-CHARGEABLE SUPPLY: Performed by: OTOLARYNGOLOGY

## 2023-01-16 PROCEDURE — 2580000003 HC RX 258: Performed by: ANESTHESIOLOGY

## 2023-01-16 PROCEDURE — 3700000000 HC ANESTHESIA ATTENDED CARE: Performed by: OTOLARYNGOLOGY

## 2023-01-16 PROCEDURE — A4217 STERILE WATER/SALINE, 500 ML: HCPCS | Performed by: OTOLARYNGOLOGY

## 2023-01-16 PROCEDURE — 2500000003 HC RX 250 WO HCPCS

## 2023-01-16 PROCEDURE — 2720000010 HC SURG SUPPLY STERILE: Performed by: OTOLARYNGOLOGY

## 2023-01-16 PROCEDURE — 7100000001 HC PACU RECOVERY - ADDTL 15 MIN: Performed by: OTOLARYNGOLOGY

## 2023-01-16 PROCEDURE — 64582 OPN MPLTJ HPGLSL NSTM ARY PG: CPT | Performed by: OTOLARYNGOLOGY

## 2023-01-16 PROCEDURE — 6360000002 HC RX W HCPCS

## 2023-01-16 PROCEDURE — 71045 X-RAY EXAM CHEST 1 VIEW: CPT

## 2023-01-16 PROCEDURE — 2580000003 HC RX 258: Performed by: OTOLARYNGOLOGY

## 2023-01-16 PROCEDURE — 93010 ELECTROCARDIOGRAM REPORT: CPT | Performed by: INTERNAL MEDICINE

## 2023-01-16 PROCEDURE — 7100000010 HC PHASE II RECOVERY - FIRST 15 MIN: Performed by: OTOLARYNGOLOGY

## 2023-01-16 PROCEDURE — 6370000000 HC RX 637 (ALT 250 FOR IP): Performed by: ANESTHESIOLOGY

## 2023-01-16 PROCEDURE — C1778 LEAD, NEUROSTIMULATOR: HCPCS | Performed by: OTOLARYNGOLOGY

## 2023-01-16 PROCEDURE — 7100000000 HC PACU RECOVERY - FIRST 15 MIN: Performed by: OTOLARYNGOLOGY

## 2023-01-16 PROCEDURE — 84703 CHORIONIC GONADOTROPIN ASSAY: CPT

## 2023-01-16 PROCEDURE — 2500000003 HC RX 250 WO HCPCS: Performed by: OTOLARYNGOLOGY

## 2023-01-16 PROCEDURE — 6360000002 HC RX W HCPCS: Performed by: ANESTHESIOLOGY

## 2023-01-16 DEVICE — GENERATOR PULSE IMPLANTABLE INSPIRE: Type: IMPLANTABLE DEVICE | Site: CHEST  WALL | Status: FUNCTIONAL

## 2023-01-16 DEVICE — LEAD STIMULATION INSPIRE: Type: IMPLANTABLE DEVICE | Site: NECK | Status: FUNCTIONAL

## 2023-01-16 DEVICE — LEAD SENSING RESPIRATORY INSPIRE: Type: IMPLANTABLE DEVICE | Site: CHEST  WALL | Status: FUNCTIONAL

## 2023-01-16 RX ORDER — CEPHALEXIN 500 MG/1
500 CAPSULE ORAL 3 TIMES DAILY
Qty: 15 CAPSULE | Refills: 0 | Status: SHIPPED | OUTPATIENT
Start: 2023-01-16 | End: 2023-01-21

## 2023-01-16 RX ORDER — FENTANYL CITRATE 50 UG/ML
INJECTION, SOLUTION INTRAMUSCULAR; INTRAVENOUS PRN
Status: DISCONTINUED | OUTPATIENT
Start: 2023-01-16 | End: 2023-01-16 | Stop reason: SDUPTHER

## 2023-01-16 RX ORDER — SUCCINYLCHOLINE CHLORIDE 20 MG/ML
INJECTION INTRAMUSCULAR; INTRAVENOUS PRN
Status: DISCONTINUED | OUTPATIENT
Start: 2023-01-16 | End: 2023-01-16 | Stop reason: SDUPTHER

## 2023-01-16 RX ORDER — LIDOCAINE HYDROCHLORIDE 10 MG/ML
1 INJECTION, SOLUTION EPIDURAL; INFILTRATION; INTRACAUDAL; PERINEURAL
Status: DISCONTINUED | OUTPATIENT
Start: 2023-01-16 | End: 2023-01-16 | Stop reason: HOSPADM

## 2023-01-16 RX ORDER — MEPERIDINE HYDROCHLORIDE 50 MG/ML
12.5 INJECTION INTRAMUSCULAR; INTRAVENOUS; SUBCUTANEOUS EVERY 5 MIN PRN
Status: DISCONTINUED | OUTPATIENT
Start: 2023-01-16 | End: 2023-01-16 | Stop reason: HOSPADM

## 2023-01-16 RX ORDER — MAGNESIUM HYDROXIDE 1200 MG/15ML
LIQUID ORAL CONTINUOUS PRN
Status: DISCONTINUED | OUTPATIENT
Start: 2023-01-16 | End: 2023-01-16 | Stop reason: HOSPADM

## 2023-01-16 RX ORDER — SODIUM CHLORIDE 9 MG/ML
INJECTION, SOLUTION INTRAVENOUS PRN
Status: DISCONTINUED | OUTPATIENT
Start: 2023-01-16 | End: 2023-01-16 | Stop reason: HOSPADM

## 2023-01-16 RX ORDER — DEXAMETHASONE SODIUM PHOSPHATE 4 MG/ML
INJECTION, SOLUTION INTRA-ARTICULAR; INTRALESIONAL; INTRAMUSCULAR; INTRAVENOUS; SOFT TISSUE PRN
Status: DISCONTINUED | OUTPATIENT
Start: 2023-01-16 | End: 2023-01-16 | Stop reason: SDUPTHER

## 2023-01-16 RX ORDER — KETOROLAC TROMETHAMINE 30 MG/ML
30 INJECTION, SOLUTION INTRAMUSCULAR; INTRAVENOUS
Status: DISCONTINUED | OUTPATIENT
Start: 2023-01-16 | End: 2023-01-16 | Stop reason: HOSPADM

## 2023-01-16 RX ORDER — HYDROCODONE BITARTRATE AND ACETAMINOPHEN 5; 325 MG/1; MG/1
1 TABLET ORAL EVERY 6 HOURS PRN
Qty: 10 TABLET | Refills: 0 | Status: SHIPPED | OUTPATIENT
Start: 2023-01-16 | End: 2023-01-19

## 2023-01-16 RX ORDER — LIDOCAINE HYDROCHLORIDE 20 MG/ML
INJECTION, SOLUTION EPIDURAL; INFILTRATION; INTRACAUDAL; PERINEURAL PRN
Status: DISCONTINUED | OUTPATIENT
Start: 2023-01-16 | End: 2023-01-16 | Stop reason: SDUPTHER

## 2023-01-16 RX ORDER — KETAMINE HCL IN NACL, ISO-OSM 100MG/10ML
SYRINGE (ML) INJECTION PRN
Status: DISCONTINUED | OUTPATIENT
Start: 2023-01-16 | End: 2023-01-16 | Stop reason: SDUPTHER

## 2023-01-16 RX ORDER — SODIUM CHLORIDE, SODIUM LACTATE, POTASSIUM CHLORIDE, CALCIUM CHLORIDE 600; 310; 30; 20 MG/100ML; MG/100ML; MG/100ML; MG/100ML
INJECTION, SOLUTION INTRAVENOUS CONTINUOUS
Status: DISCONTINUED | OUTPATIENT
Start: 2023-01-16 | End: 2023-01-16 | Stop reason: HOSPADM

## 2023-01-16 RX ORDER — ONDANSETRON 2 MG/ML
INJECTION INTRAMUSCULAR; INTRAVENOUS PRN
Status: DISCONTINUED | OUTPATIENT
Start: 2023-01-16 | End: 2023-01-16 | Stop reason: SDUPTHER

## 2023-01-16 RX ORDER — MIDAZOLAM HYDROCHLORIDE 1 MG/ML
INJECTION INTRAMUSCULAR; INTRAVENOUS PRN
Status: DISCONTINUED | OUTPATIENT
Start: 2023-01-16 | End: 2023-01-16 | Stop reason: SDUPTHER

## 2023-01-16 RX ORDER — SODIUM CHLORIDE 0.9 % (FLUSH) 0.9 %
5-40 SYRINGE (ML) INJECTION EVERY 12 HOURS SCHEDULED
Status: DISCONTINUED | OUTPATIENT
Start: 2023-01-16 | End: 2023-01-16 | Stop reason: HOSPADM

## 2023-01-16 RX ORDER — ONDANSETRON 2 MG/ML
4 INJECTION INTRAMUSCULAR; INTRAVENOUS
Status: COMPLETED | OUTPATIENT
Start: 2023-01-16 | End: 2023-01-16

## 2023-01-16 RX ORDER — DROPERIDOL 2.5 MG/ML
0.62 INJECTION, SOLUTION INTRAMUSCULAR; INTRAVENOUS
Status: DISCONTINUED | OUTPATIENT
Start: 2023-01-16 | End: 2023-01-16 | Stop reason: HOSPADM

## 2023-01-16 RX ORDER — PROPOFOL 10 MG/ML
INJECTION, EMULSION INTRAVENOUS PRN
Status: DISCONTINUED | OUTPATIENT
Start: 2023-01-16 | End: 2023-01-16 | Stop reason: SDUPTHER

## 2023-01-16 RX ORDER — OXYCODONE HYDROCHLORIDE 5 MG/1
5 TABLET ORAL EVERY 30 MIN PRN
Status: DISCONTINUED | OUTPATIENT
Start: 2023-01-16 | End: 2023-01-16 | Stop reason: HOSPADM

## 2023-01-16 RX ORDER — ACETAMINOPHEN 500 MG
1000 TABLET ORAL
Status: DISCONTINUED | OUTPATIENT
Start: 2023-01-16 | End: 2023-01-16 | Stop reason: HOSPADM

## 2023-01-16 RX ORDER — CHOLECALCIFEROL (VITAMIN D3) 1250 MCG
CAPSULE ORAL
COMMUNITY

## 2023-01-16 RX ORDER — PHENYLEPHRINE HCL IN 0.9% NACL 1 MG/10 ML
SYRINGE (ML) INTRAVENOUS PRN
Status: DISCONTINUED | OUTPATIENT
Start: 2023-01-16 | End: 2023-01-16 | Stop reason: SDUPTHER

## 2023-01-16 RX ORDER — SODIUM CHLORIDE 0.9 % (FLUSH) 0.9 %
5-40 SYRINGE (ML) INJECTION PRN
Status: DISCONTINUED | OUTPATIENT
Start: 2023-01-16 | End: 2023-01-16 | Stop reason: HOSPADM

## 2023-01-16 RX ORDER — LIDOCAINE HYDROCHLORIDE AND EPINEPHRINE 10; 10 MG/ML; UG/ML
INJECTION, SOLUTION INFILTRATION; PERINEURAL PRN
Status: DISCONTINUED | OUTPATIENT
Start: 2023-01-16 | End: 2023-01-16 | Stop reason: HOSPADM

## 2023-01-16 RX ADMIN — SUCCINYLCHOLINE CHLORIDE 70 MG: 20 INJECTION, SOLUTION INTRAMUSCULAR; INTRAVENOUS at 08:41

## 2023-01-16 RX ADMIN — SODIUM CHLORIDE, SODIUM LACTATE, POTASSIUM CHLORIDE, AND CALCIUM CHLORIDE: .6; .31; .03; .02 INJECTION, SOLUTION INTRAVENOUS at 08:30

## 2023-01-16 RX ADMIN — FENTANYL CITRATE 25 MCG: 50 INJECTION INTRAMUSCULAR; INTRAVENOUS at 09:43

## 2023-01-16 RX ADMIN — Medication 100 MCG: at 09:01

## 2023-01-16 RX ADMIN — Medication 0.5 MG: at 10:55

## 2023-01-16 RX ADMIN — MIDAZOLAM HYDROCHLORIDE 2 MG: 2 INJECTION, SOLUTION INTRAMUSCULAR; INTRAVENOUS at 08:35

## 2023-01-16 RX ADMIN — FENTANYL CITRATE 25 MCG: 50 INJECTION INTRAMUSCULAR; INTRAVENOUS at 09:47

## 2023-01-16 RX ADMIN — Medication 15 MG: at 08:52

## 2023-01-16 RX ADMIN — ONDANSETRON 4 MG: 2 INJECTION INTRAMUSCULAR; INTRAVENOUS at 11:28

## 2023-01-16 RX ADMIN — LIDOCAINE HYDROCHLORIDE 60 MG: 20 INJECTION, SOLUTION EPIDURAL; INFILTRATION; INTRACAUDAL; PERINEURAL at 08:41

## 2023-01-16 RX ADMIN — CEFAZOLIN 2000 MG: 10 INJECTION, POWDER, FOR SOLUTION INTRAVENOUS at 08:45

## 2023-01-16 RX ADMIN — OXYCODONE HYDROCHLORIDE 5 MG: 5 TABLET ORAL at 11:20

## 2023-01-16 RX ADMIN — Medication 5 MG: at 09:46

## 2023-01-16 RX ADMIN — PROPOFOL 200 MG: 10 INJECTION, EMULSION INTRAVENOUS at 08:41

## 2023-01-16 RX ADMIN — PROPOFOL 50 MG: 10 INJECTION, EMULSION INTRAVENOUS at 09:26

## 2023-01-16 RX ADMIN — FENTANYL CITRATE 50 MCG: 50 INJECTION INTRAMUSCULAR; INTRAVENOUS at 08:41

## 2023-01-16 RX ADMIN — Medication 100 MCG: at 09:06

## 2023-01-16 RX ADMIN — HYDROMORPHONE HYDROCHLORIDE 0.5 MG: 1 INJECTION, SOLUTION INTRAMUSCULAR; INTRAVENOUS; SUBCUTANEOUS at 10:55

## 2023-01-16 RX ADMIN — DEXAMETHASONE SODIUM PHOSPHATE 6 MG: 4 INJECTION, SOLUTION INTRAMUSCULAR; INTRAVENOUS at 08:50

## 2023-01-16 RX ADMIN — ONDANSETRON 4 MG: 2 INJECTION INTRAMUSCULAR; INTRAVENOUS at 09:23

## 2023-01-16 RX ADMIN — Medication 100 MCG: at 09:21

## 2023-01-16 ASSESSMENT — PAIN SCALES - GENERAL
PAINLEVEL_OUTOF10: 5
PAINLEVEL_OUTOF10: 5

## 2023-01-16 ASSESSMENT — PAIN - FUNCTIONAL ASSESSMENT: PAIN_FUNCTIONAL_ASSESSMENT: NONE - DENIES PAIN

## 2023-01-16 ASSESSMENT — LIFESTYLE VARIABLES: SMOKING_STATUS: 1

## 2023-01-16 NOTE — ANESTHESIA PRE PROCEDURE
Department of Anesthesiology  Preprocedure Note       Name:  Wilber Iverson   Age:  36 y.o.  :  1980                                          MRN:  [id number]         Date:  2023      Surgeon: Kindra Cervantes):  Francisco Perkins DO    Procedure: Procedure(s):  PLACEMENT OS INSPIRE    Medications prior to admission:   Prior to Admission medications    Medication Sig Start Date End Date Taking? Authorizing Provider   Cholecalciferol (VITAMIN D3) 1.25 MG (02085 UT) CAPS Take by mouth    Historical Provider, MD   VORTIoxetine (TRINTELLIX) 10 MG TABS tablet Take 10 mg by mouth daily  Patient not taking: Reported on 2023    Historical Provider, MD   hydrOXYzine HCl (ATARAX) 25 MG tablet Take 25 mg by mouth 3 times daily as needed for Itching    Historical Provider, MD   tretinoin (RETIN-A) 0.1 % cream Apply topically nightly Apply topically nightly. Historical Provider, MD   DICLOFENAC PO Take 100 mg by mouth daily    Historical Provider, MD   buPROPion (WELLBUTRIN XL) 300 MG extended release tablet TAKE 1 TABLET BY MOUTH EVERY DAY 22   Historical Provider, MD   ALPRAZolam (XANAX) 0.5 MG tablet alprazolam 0.5 mg tablet   TAKE 1 TABLET BY MOUTH 3 TIMES A DAY    Historical Provider, MD   cyclobenzaprine (FLEXERIL) 10 MG tablet Take 10 mg by mouth nightly as needed 22   Historical Provider, MD   valACYclovir (VALTREX) 1 g tablet TAKE 1 TABLET BY MOUTH TWICE A DAY FOR 5 DAYS 22   Historical Provider, MD       Current medications:    No current facility-administered medications for this visit. No current outpatient medications on file.      Facility-Administered Medications Ordered in Other Visits   Medication Dose Route Frequency Provider Last Rate Last Admin    lidocaine PF 1 % injection 1 mL  1 mL IntraDERmal Once PRN Elida Singleton MD        lactated ringers infusion   IntraVENous Continuous Elida Singleton MD        sodium chloride flush 0.9 % injection 5-40 mL  5-40 mL IntraVENous 2 times per day Shiloh Villalta MD        sodium chloride flush 0.9 % injection 5-40 mL  5-40 mL IntraVENous PRN Shiloh Villalta MD        0.9 % sodium chloride infusion   IntraVENous PRN Shiloh Villalta MD        sodium chloride flush 0.9 % injection 5-40 mL  5-40 mL IntraVENous 2 times per day Estrellita Salazar,         sodium chloride flush 0.9 % injection 5-40 mL  5-40 mL IntraVENous PRN Estrellita Led, DO        0.9 % sodium chloride infusion   IntraVENous PRN Estrellita Led, DO        ceFAZolin (ANCEF) 2000 mg in dextrose 5 % 100 mL IVPB  2,000 mg IntraVENous On Call to [facility],            Allergies:  No Known Allergies    Problem List:    Patient Active Problem List   Diagnosis Code    Obstructive sleep apnea (adult) (pediatric) G47.33       Past Medical History:        Diagnosis Date    Anxiety     Depression     Sleep apnea        Past Surgical History:        Procedure Laterality Date    LARYNGOSCOPY N/A 11/14/2022    DRUG INDUCED SLEEP ENDOSCOPY performed by Estrellita Salazar DO at [address]      TONSILLECTOMY AND ADENOIDECTOMY         Social History:    Social History     Tobacco Use    Smoking status: Some Days     Packs/day: 0.25     Types: Cigarettes    Smokeless tobacco: Never    Tobacco comments:     Smokes about a pack every two weeks   Substance Use Topics    Alcohol use: Not Currently     Alcohol/week: 0.0 - 6.0 standard drinks     Comment: Socially                                Ready to quit: Not Answered  Counseling given: Not Answered  Tobacco comments: Smokes about a pack every two weeks      Vital Signs (Current): There were no vitals filed for this visit.                                            BP Readings from Last 3 Encounters:   01/16/23 122/79   11/14/22 119/81       NPO Status:                                                                                 BMI:   Wt Readings from Last 3 Encounters:   01/16/23 163 lb (73.9 kg)   11/09/22 170 lb (77.1 kg)   09/30/22 170 lb (77.1 kg)     There is no height or weight on file to calculate BMI.    CBC: No results found for: WBC, RBC, HGB, HCT, MCV, RDW, PLT    CMP: No results found for: NA, K, CL, CO2, BUN, CREATININE, GFRAA, AGRATIO, LABGLOM, GLUCOSE, GLU, PROT, CALCIUM, BILITOT, ALKPHOS, AST, ALT    POC Tests: No results for input(s): POCGLU, POCNA, POCK, POCCL, POCBUN, POCHEMO, POCHCT in the last 72 hours. Coags: No results found for: PROTIME, INR, APTT    HCG (If Applicable):   Lab Results   Component Value Date    PREGTESTUR Negative 01/16/2023        ABGs: No results found for: PHART, PO2ART, DDP9VRK, AAP3UUG, BEART, A3OQXJAY     Type & Screen (If Applicable):  No results found for: LABABO, LABRH    Drug/Infectious Status (If Applicable):  No results found for: HIV, HEPCAB    COVID-19 Screening (If Applicable): No results found for: COVID19        Anesthesia Evaluation  Patient summary reviewed no history of anesthetic complications:   Airway: Mallampati: II  TM distance: >3 FB   Neck ROM: full  Mouth opening: > = 3 FB   Dental: normal exam         Pulmonary:normal exam  breath sounds clear to auscultation  (+) sleep apnea:  current smoker          Patient did not smoke on day of surgery. Cardiovascular:Negative CV ROS        (-) past MI, CAD, CABG/stent, dysrhythmias and  angina      Rhythm: regular  Rate: normal                    Neuro/Psych:   (+) psychiatric history:depression/anxiety             GI/Hepatic/Renal: Neg GI/Hepatic/Renal ROS            Endo/Other: Negative Endo/Other ROS                    Abdominal:             Vascular: negative vascular ROS. Other Findings:             Anesthesia Plan      general     ASA 2       Induction: intravenous. MIPS: Postoperative opioids intended and Prophylactic antiemetics administered. Anesthetic plan and risks discussed with patient. Plan discussed with CRNA.     Attending anesthesiologist reviewed and agrees with Preprocedure content                Alan Espitia MD   1/16/2023

## 2023-01-16 NOTE — INTERVAL H&P NOTE
Update History & Physical    The patient's History and Physical of January 6, 2023 was reviewed with the patient and I examined the patient. There was no change. The surgical site was confirmed by the patient and me. Plan: The risks, benefits, expected outcome, and alternative to the recommended procedure have been discussed with the patient. Patient understands and wants to proceed with the procedure.      Electronically signed by Sarah Chaparro DO on 1/16/2023 at 8:09 AM

## 2023-01-16 NOTE — ANESTHESIA POSTPROCEDURE EVALUATION
Department of Anesthesiology  Postprocedure Note    Patient: Radha Mayo  MRN: [id number]  [place]: 1980  Date of evaluation: 1/16/2023      Procedure Summary     Date: 01/16/23 Room / Location: [place] [address] 06 / [facility]    Anesthesia Start: 1396 Anesthesia Stop: 8888    Procedure: PLACEMENT OS INSPIRE (Chest) Diagnosis:       Obstructive sleep apnea (adult) (pediatric)      (OBSTRUCTIVE SLEEP APNEA)    Surgeons: Flaquito Sanchez DO Responsible Provider: Elizabeth Salamanca MD    Anesthesia Type: General ASA Status: 2          Anesthesia Type: General    Celestina Phase I: Celestina Score: 9    Celestina Phase II: Celestina Score: 9      Anesthesia Post Evaluation    Patient location during evaluation: PACU  Patient participation: complete - patient participated  Level of consciousness: awake and alert  Airway patency: patent  Nausea & Vomiting: no nausea and no vomiting  Complications: no  Cardiovascular status: hemodynamically stable  Respiratory status: acceptable, room air and spontaneous ventilation  Hydration status: stable  Comments: ---------------------------               01/16/23                      0755         ---------------------------   BP:          122/79         Pulse:         76           Resp:          16           Temp:   97.5 °F (36.4 °C)   SpO2:          95%         ---------------------------

## 2023-01-16 NOTE — DISCHARGE INSTRUCTIONS
[id number] [facility]  [address] [place] [address] Drive. [address], [address] [facility], [place]  [phone]    POST-OP Instructions for Neck Operations  Diet  Resume regular diet, as tolerated. You may experience some nausea after surgery for up to 24 hours from the general anesthetic. Take any pain medications with food to avoid upset stomach   Drink plenty of liquids    Activity  Avoid Straining, bending or lifting or vigorous exercise for 1 weeks  Keep the head of your bed elevated to reduce swelling for the first 72 hours  May shower 24 hours after surgery. Let water run over the incision, do not scrub. Pat dry  Start neck rolls the day after surgery- 10 to the left and right 3 times a day   Do not lift your right arm above your shoulder for 1 month after surgery    General Instructions  Drainage from the incision during the first few days is expected. If you have excessive bleeding or green drainage with surrounding redness please call the number above. Swelling at incision sites is expected and will typically improve over the first 2 weeks. Most patients can expect some swelling under the jaw that will give the appearance of a double chin.  This will improve over 2-4 weeks. Before your appointment with Dr Maria E Mckeon the [facility]   to your phone, Create an Account, Connect to your sleep Dr Darlin Schneider your Patient Remote. Instructions have been provided with your sleep remote box. Always bring your phone & sleep remote to every doctor appointment. Medications  Resume your normal medications  Take antibiotics prescribed  Take pain medications as needed for pain  DO NOT take aspirin or aspirin products. NO Advil, Motrin, Aleve, Ibuprofen for two weeks following surgery. DO NOT use any herbal medicines/diet pills for two weeks after surgery.     Call the Office [phone]  Fever greater than 100.4  Sudden swelling in neck causing difficulty swallowing or breathing is an emergency.     Sudden increase in pain

## 2023-01-16 NOTE — OP NOTE
Otolaryngology-Head and Neck Surgery   Operative Note   Date of Operation:   01/16/23    Pre-operative Diagnosis:   OBSTRUCTIVE SLEEP APNEA     Post-operative Diagnosis:   * No post-op diagnosis entered *     Operative Procedure:   Procedure(s):  PLACEMENT OS INSPIRE (N/A)       Attending Surgeon:   Salvatore Azul DO    OR Staff:   Circulator: Bebo Otero RN  Surgical Assistant: Govind Hernandez Scrub: [facility]  Scrub Person First: Janny Xiong     Assistant:   None     Anesthesia Staff:   Anesthesiologist: Mela Miller MD  CRNA: Claribel Craig APRN - CRNA; Missy Schwab APRN - CRNA     Anesthesia Type:   General     Estimated Blood Loss:   5mL    Operative Complications:   none    Specimens:   * No specimens in log *     Implants:   Implant Name Type Inv. Item Serial No.  Lot No. LRB No. Used Action   GENERATOR PULSE IMPLANTABLE INSPIRE - [id number]  GENERATOR PULSE IMPLANTABLE INSPIRE [id number] INSPIRE [employer] SYSTEM[employer] -319-056 N/A 1 Implanted   LEAD SENSING RESPIRATORY INSPIRE -  Implantable long term continuous electrocardiography EKG system LEAD SENSING RESPIRATORY INSPIRE [id number] IN[employer] SYSTEMS -014-003 N/A 1 Implanted   LEAD STIMULATION INSPIRE - [id number] Neuro/Shunt LEAD STIMULATION INSPIRE [id number] [employer] SYSTEM[employer]--013-001 N/A 1 Implanted        Operative Findings:   Successful implantation of Inspire Device    Disposition of Patient:  Stable to PACU    Operative Indications: 36 y.o. female with a past history significant for obstructive sleep apnea, and intolerance to CPAP after at least 3-month trial.  Sleep endoscopy demonstrated anterior posterior collapse. BMI 27. Patient was approved by [employer]. Risk benefits alternatives discussed with patient in detail. All questions answered.     OPERATIVE PROCEDURE: The patient was brought to the Operating Room and was anesthetized via general endotracheal anesthesia without complication. A shoulder roll was placed and the patient was prepped and draped in usual sterile fashion with the head turned to the left. Prior to prepping and draping, electrodes were placed in the genioglossus and styloglossus muscle and connected to the NIM box for intraoperative nerve monitoring. A 4 cm incision was made in the right upper neck approximately 1 cm from midline midway between mandible and hyoid bone. Dissection was carried down through the subcutaneous tissue and platysma. The inferior border of the submandibular gland was identified as well as the digastric tendon. The submandibular gland and the overlying fascia with the marginal mandibular nerve were retracted superiorly. The digastric was retracted inferiorly with vessel loops. Dissection was carried down into the digastric triangle where the hypoglossal nerve was identified in its usual fashion. The mylohyoid muscle was retracted anteriorly, and the hypoglossal nerve was dissected up towards the floor of the mouth. The lateral branches to retrusor muscles were identified, and tested intra-operatively using the NIM stimulator. The cuff electrode for the hypoglossal nerve stimulator was placed distally to these branches on the medial nerve branch to the genioglossus muscle. Diagnostic evaluation confirmed activation of the genioglossus nerve, resulting in genioglossal activation and tongue protrusion, confirmed visually. The nerve cuff was flushed with normal saline and the cuff electrode was secured to the digastric tendon 2 3-0 silk sutures. A second 5 cm incision was made in the right upper chest 1 cm from sternal edge overlying the 2nd/3rd intercostal space. Dissection was carried down to the pectoralis muscle. Blunt dissection was used to develop a pocket superficial to the pectoralis fascia. 2 x 2-0 silk sutures were secured then to pectoralis muscle on the lateral and medial aspects of the incision 4 cm apart.   Blunt dissection was then used through the pectoralis muscle into the fatty later deep to the pectoralis muscle. This fatty layer was  removed with use of kittner exposing the external intercostal fascia. A DeBakey was used to lift the external intercostal fibers [place] was used to create an opening down to the internal intercostal fascial plane. The sensor lead was then placed into the opening. This was secured with three 3-0 silk sutures using the primary anchor to the external intercostal fibers. The secondary anchor was then secured with two 3-0 silk sutures to the superficial pectoralis major muscle. The stimulation lead was then tunneled from the neck in a subplatysmal plane and brought out into the pocket leaving 3 cm strain relief in the neck. The leads were  connected to the device using the 2 person 3 handed technique after ensuring all the connectors were clean and dry. The device was then tested using the . Tongue protrusion was tested at 1 V and in decreasing voltages until the tongue did not move. Diagnostic evaluation was run, which confirmed a good respiration sensing signal as well as good tongue protrusion stimulation. The IPG was placed in the subclavicular pocket and secured loosely to the pectoralis fascia using the previously placed 2-0 silk suture. The wounds were then closed in three layers with deep 3-0 Vicryl and a 4-0 running subcuticular monocryl. Dermabond was applied. The patient was then awakened, extubated, and transferred to Recovery Room in stable condition.

## 2023-01-16 NOTE — BRIEF OP NOTE
Brief Postoperative Note      Patient: Ced Foster  YOB: 1980  MRN: [id number]    Date of Procedure: 1/16/2023    Pre-Op Diagnosis: OBSTRUCTIVE SLEEP APNEA    Post-Op Diagnosis: Same       Procedure(s):  PLACEMENT OS INSPIRE    Surgeon(s):  Neil Piedra DO    Assistant:  Surgical Assistant: Fabrizio Mane    Anesthesia: General    Estimated Blood Loss (mL): Minimal    Complications: None    Specimens:   * No specimens in log *    Implants:  Implant Name Type Inv.  Item Serial No.  Lot No. LRB No. Used Action   GENERATOR PULSE IMPLANTABLE INSPIRE - [id number]  GENERATOR PULSE IMPLANTABLE INSPIRE [id number] [employer] SYSTEM[employer] -378-832 N/A 1 Implanted   LEAD SENSING RESPIRATORY INSPIRE -  Implantable long term continuous electrocardiography EKG system LEAD SENSING RESPIRATORY INSPIRE [id number] [employer] SYSTEMS -014-003 N/A 1 Implanted   LEAD STIMULATION INSPIRE - [id number] Neuro/Shunt LEAD STIMULATION INSPIRE [id number] [employer] SYSTEM[employer]--013-001 N/A 1 Implanted         Drains: * No LDAs found *    Findings: successful implantation of inspire    Electronically signed by Neil Piedra DO on 1/16/2023 at 9:41 AM

## 2023-01-16 NOTE — PROGRESS NOTES
Patient admitted to [address]. Consents verified. Patient NPO since 2200. Patient to keep all belongings in [facility] locker during procedure, including phone and purse.

## 2023-01-18 ENCOUNTER — TELEPHONE (OUTPATIENT)
Dept: ENT CLINIC | Age: 43
End: 2023-01-18

## 2023-01-18 NOTE — TELEPHONE ENCOUNTER
Patient has been taking pain medication one every 6 hours, would like to know if she can have a refill of pain meds sent to [facility] pharmacy in [facility]? She is miserable and states her throat is hurting and the incision sight is very painful.   How long should the pain last?    Patient had inspire placement 1/16/2023    Patient phone number [phone]

## 2023-01-19 DIAGNOSIS — G89.18 POST-OP PAIN: Primary | ICD-10-CM

## 2023-01-19 RX ORDER — TRAMADOL HYDROCHLORIDE 50 MG/1
50 TABLET ORAL EVERY 4 HOURS PRN
Qty: 18 TABLET | Refills: 0 | Status: SHIPPED | OUTPATIENT
Start: 2023-01-19 | End: 2023-01-22

## 2023-01-19 NOTE — TELEPHONE ENCOUNTER
Call placed to this patient, reached voicemail. Left detailed message per Dr. Jing Cheatham instructions.

## 2023-01-20 ENCOUNTER — OFFICE VISIT (OUTPATIENT)
Dept: ENT CLINIC | Age: 43
End: 2023-01-20

## 2023-01-20 VITALS
BODY MASS INDEX: 27.14 KG/M2 | DIASTOLIC BLOOD PRESSURE: 95 MMHG | WEIGHT: 159 LBS | RESPIRATION RATE: 16 BRPM | HEART RATE: 90 BPM | TEMPERATURE: 97.3 F | SYSTOLIC BLOOD PRESSURE: 142 MMHG | HEIGHT: 64 IN

## 2023-01-20 DIAGNOSIS — G47.33 OBSTRUCTIVE SLEEP APNEA (ADULT) (PEDIATRIC): ICD-10-CM

## 2023-01-20 DIAGNOSIS — G89.18 POST-OP PAIN: Primary | ICD-10-CM

## 2023-01-20 PROCEDURE — 99024 POSTOP FOLLOW-UP VISIT: CPT | Performed by: OTOLARYNGOLOGY

## 2023-01-20 RX ORDER — METHYLPREDNISOLONE 4 MG/1
TABLET ORAL
Qty: 1 KIT | Refills: 0 | Status: SHIPPED | OUTPATIENT
Start: 2023-01-20 | End: 2023-01-26

## 2023-01-20 NOTE — PROGRESS NOTES
[address], [address], [address] [place] [address]  P: [phone]       Patient     Chace Allen  1980    ChiefComplaint     Chief Complaint   Patient presents with    Post-Op Check     Patient states that she is here for a po check after her inspire placement. She has been having tongue pain and has not been eating much. She has also been getting headaches when she chews. History of Present Illness     Ancelmo Rios is a 39-year-old female 5 days status post inspire placement. Reports overall doing well. Does have headache that occurs whenever she chews or swallows with movement of her tongue. Past Medical History     Past Medical History:   Diagnosis Date    Anxiety     Depression     Sleep apnea        Past Surgical History     Past Surgical History:   Procedure Laterality Date    LARYNGOSCOPY N/A 11/14/2022    [facility] N/A 1/16/2023    PLACEMENT OS INSPIRE performed by Laurie Taylor DO at [address]         Family History     Family History   Problem Relation Age of Onset    Lupus Mother     COPD Mother        Social History     Social History     Tobacco Use    Smoking status: Some Days     Packs/day: 0.25     Types: Cigarettes    Smokeless tobacco: Never    Tobacco comments:     Smokes about a pack every two weeks   Vaping Use    Vaping Use: Never used   Substance Use Topics    Alcohol use: Not Currently     Alcohol/week: 0.0 - 6.0 standard drinks     Comment: Socially    Drug use: Never        Allergies     No Known Allergies    Medications     Current Outpatient Medications   Medication Sig Dispense Refill    methylPREDNISolone (MEDROL DOSEPACK) 4 MG tablet Take by mouth.  1 kit 0    buPROPion (WELLBUTRIN XL) 300 MG extended release tablet TAKE 1 TABLET BY MOUTH EVERY DAY      traMADol (ULTRAM) 50 MG tablet Take 1 tablet by mouth every 4 hours as needed for Pain for up to 3 days. Intended supply: 3 days. Take lowest dose possible to manage pain Max Daily Amount: 300 mg (Patient not taking: Reported on 1/20/2023) 18 tablet 0    Cholecalciferol (VITAMIN D3) 1.25 MG (84897 UT) CAPS Take by mouth (Patient not taking: Reported on 1/20/2023)      cephALEXin (KEFLEX) 500 MG capsule Take 1 capsule by mouth 3 times daily for 5 days (Patient not taking: Reported on 1/20/2023) 15 capsule 0    hydrOXYzine HCl (ATARAX) 25 MG tablet Take 25 mg by mouth 3 times daily as needed for Itching (Patient not taking: Reported on 1/20/2023)      tretinoin (RETIN-A) 0.1 % cream Apply topically nightly Apply topically nightly. (Patient not taking: Reported on 1/20/2023)      DICLOFENAC PO Take 100 mg by mouth daily (Patient not taking: Reported on 1/20/2023)      ALPRAZolam (XANAX) 0.5 MG tablet alprazolam 0.5 mg tablet   TAKE 1 TABLET BY MOUTH 3 TIMES A DAY (Patient not taking: Reported on 1/20/2023)      cyclobenzaprine (FLEXERIL) 10 MG tablet Take 10 mg by mouth nightly as needed (Patient not taking: Reported on 1/20/2023)      valACYclovir (VALTREX) 1 g tablet TAKE 1 TABLET BY MOUTH TWICE A DAY FOR 5 DAYS (Patient not taking: Reported on 1/20/2023)       No current facility-administered medications for this visit. PhysicalExam     Vitals:    01/20/23 1320   BP: (!) 142/95   Site: Right Upper Arm   Position: Sitting   Cuff Size: Medium Adult   Pulse: 90   Resp: 16   Temp: 97.3 °F (36.3 °C)   TempSrc: Infrared   Weight: 159 lb (72.1 kg)   Height: 5' 4\" (1.626 m)       Right marginal mandibular nerve intact  Tongue mobile bilaterally slight deviation to right with protrusion  Anterior cervical incision well-healed  Anterior chest wall incision well-healed      Assessment and Plan     1. Post-op pain  - methylPREDNISolone (MEDROL DOSEPACK) 4 MG tablet; Take by mouth. Dispense: 1 kit; Refill: 0    2.  Obstructive sleep apnea (adult) (pediatric)      Overall doing well and is complaining of headache with tongue motion. Plan for steroids follow-up in 1 week    Elsy Angeles DO  1/20/23      Portions of this note were dictated using Dragon.  There may be linguistic errors secondary to the use of this program.

## 2023-01-25 ENCOUNTER — OFFICE VISIT (OUTPATIENT)
Dept: ENT CLINIC | Age: 43
End: 2023-01-25

## 2023-01-25 VITALS — TEMPERATURE: 97.3 F | WEIGHT: 159 LBS | HEIGHT: 64 IN | OXYGEN SATURATION: 96 % | BODY MASS INDEX: 27.14 KG/M2

## 2023-01-25 DIAGNOSIS — G47.33 OBSTRUCTIVE SLEEP APNEA (ADULT) (PEDIATRIC): Primary | ICD-10-CM

## 2023-01-25 PROCEDURE — 99024 POSTOP FOLLOW-UP VISIT: CPT | Performed by: OTOLARYNGOLOGY

## 2023-01-25 NOTE — PROGRESS NOTES
Jerrod Mishra is 1.5-week status post inspire placement. Reports significant improvement in pain since last seen but does develop significant headache and right ear pain when chewing. History of clenching of teeth not wearing a bite guard believes she may be doing more of this. Overall feeling improved but not resolved      Tongue mobile bilaterally very minimal deviation of tip of tongue with full bilateral range of motion  Anterior cervical incision well-healed  Chest wall incision well-healed        Discussed with patient that I suspect suspect underlying TMJ is contributing to symptoms recommend obtaining over-the-counter bite guard, warm compresses, soft diet and Motrin. She has activation scheduled with Dr. Brad Pierson on the 28th.   Okay to proceed

## 2023-02-28 ENCOUNTER — PROCEDURE VISIT (OUTPATIENT)
Dept: PULMONOLOGY | Age: 43
End: 2023-02-28
Payer: COMMERCIAL

## 2023-02-28 DIAGNOSIS — G47.33 OBSTRUCTIVE SLEEP APNEA (ADULT) (PEDIATRIC): Primary | ICD-10-CM

## 2023-02-28 PROCEDURE — 99213 OFFICE O/P EST LOW 20 MIN: CPT | Performed by: INTERNAL MEDICINE

## 2023-02-28 ASSESSMENT — ENCOUNTER SYMPTOMS
ALLERGIC/IMMUNOLOGIC NEGATIVE: 1
GASTROINTESTINAL NEGATIVE: 1
RESPIRATORY NEGATIVE: 1
EYES NEGATIVE: 1

## 2023-02-28 NOTE — PROGRESS NOTES
Madison Hassan (: 1980 ) is a 36 y.o. female here for an evaluation of No chief complaint on file. ASSESSMENT/PLAN:   Diagnosis Orders   1. Obstructive sleep apnea (adult) (pediatric)          Sleep study qualifying for inspire done 2022      AHI of 19.7    BMI of 27        Drug-induced sleep endoscopy  Date of Procedure: 22  Time: 0830     Pre Operative Diagnoses: Obstructive Sleep Apnea  Post Operative Diagnoses:  Obstructive Sleep Apnea           Procedure:  1. Drug Induced Sleep endoscopy (20768)       Surgeon: Leoncio Felder DO     In the hypopharynx, a very large, tongue base is observed in complete anterior-posterior retrolingual/retroepiglottic obstruction. In summary, there is no evidence of complete concentric palatal obstruction and does appear to be a candidate anatomically for hypoglossal nerve stimulation therapy. Inspire implantation done  Date of Operation:   23     Pre-operative Diagnosis:   OBSTRUCTIVE SLEEP APNEA      Post-operative Diagnosis:   * No post-op diagnosis entered *      Operative Procedure:   Procedure(s):  PLACEMENT OS INSPIRE (N/A)         Attending Surgeon:   Leoncio Felder DO        Patient has come on 2023 after inspire implantation for activation  Still having some neuropraxia  We will have the patient come back in about 4 to 6 weeks          No follow-ups on file. SUBJECTIVE/OBJECTIVE:    Consultation for inspire implantation  Patient was referred by Dr. Mallory Miramontes  Initially seen by Dr. Mallory Miramontes  Initially seen by Dr. Mallory Miramontes on 2022 because of issues of sleep apnea and feeling of claustrophobia and not able to tolerate CPAP therapy    Patient had drug-induced sleep endoscopy done 2022  Implantation of the inspire done 2023      Since implantation patient has been tolerating well  No issues          Having some neuropraxia  On initial visit on 2023        Review of Systems   Constitutional: Negative.     HENT: Negative. Eyes: Negative. Respiratory: Negative. Cardiovascular: Negative. Gastrointestinal: Negative. Endocrine: Negative. Genitourinary: Negative. Musculoskeletal: Negative. Skin: Negative. Allergic/Immunologic: Negative. Neurological: Negative. Hematological: Negative. Psychiatric/Behavioral: Negative. There were no vitals filed for this visit. Physical Exam  Vitals and nursing note reviewed. Constitutional:       General: She is not in acute distress. Appearance: Normal appearance. She is not ill-appearing. HENT:      Head: Normocephalic and atraumatic. Right Ear: External ear normal.      Left Ear: External ear normal.      Nose: Nose normal.      Mouth/Throat:      Mouth: Mucous membranes are moist.      Pharynx: Oropharynx is clear. Comments: Mallampati 3  Inspire incision at the jaw is clean and well-healed  Eyes:      General: No scleral icterus. Extraocular Movements: Extraocular movements intact. Conjunctiva/sclera: Conjunctivae normal.      Pupils: Pupils are equal, round, and reactive to light. Cardiovascular:      Rate and Rhythm: Normal rate and regular rhythm. Pulses: Normal pulses. Heart sounds: Normal heart sounds. No murmur heard. No friction rub. Pulmonary:      Effort: No respiratory distress. Breath sounds: No wheezing or rales. Comments: Inspire incision on the chest wall, right side, clean and well-healed  Abdominal:      General: Abdomen is flat. Bowel sounds are normal. There is no distension. Tenderness: There is no abdominal tenderness. There is no guarding. Musculoskeletal:         General: No swelling or tenderness. Normal range of motion. Cervical back: Normal range of motion and neck supple. No rigidity. Skin:     General: Skin is warm and dry. Coloration: Skin is not jaundiced. Neurological:      General: No focal deficit present.       Mental Status: She is alert and oriented to person, place, and time. Mental status is at baseline. Cranial Nerves: No cranial nerve deficit. Sensory: No sensory deficit. Motor: No weakness. Gait: Gait normal.   Psychiatric:         Mood and Affect: Mood normal.         Thought Content:  Thought content normal.         Judgment: Judgment normal.            Jose Martin Seaman MD

## 2023-02-28 NOTE — PATIENT INSTRUCTIONS
ASSESSMENT/PLAN:   Diagnosis Orders   1. Obstructive sleep apnea (adult) (pediatric)          Sleep study qualifying for inspire done June 2022      AHI of 19.7    BMI of 27        Drug-induced sleep endoscopy  Date of Procedure: 11/14/22  Time: 0830     Pre Operative Diagnoses: Obstructive Sleep Apnea  Post Operative Diagnoses:  Obstructive Sleep Apnea           Procedure:  1. Drug Induced Sleep endoscopy (13996)       Surgeon: Kira Perez DO     In the hypopharynx, a very large, tongue base is observed in complete anterior-posterior retrolingual/retroepiglottic obstruction. In summary, there is no evidence of complete concentric palatal obstruction and does appear to be a candidate anatomically for hypoglossal nerve stimulation therapy.              Inspire implantation done  Date of Operation:   01/16/23     Pre-operative Diagnosis:   OBSTRUCTIVE SLEEP APNEA      Post-operative Diagnosis:   * No post-op diagnosis entered *      Operative Procedure:   Procedure(s):  PLACEMENT OS INSPIRE (N/A)         Attending Surgeon:   Kira Perez DO        Patient has come on 2/28/2023 after inspire implantation for activation  Still having some neuropraxia  We will have the patient come back in about 4 to 6 weeks

## 2023-02-28 NOTE — LETTER
February 28, 2023       Pedro Prince YOB: 1980   [address] Date of Visit:  2/28/2023 2/28/23        Pedro Prince      I have seen this patient in the office today and wanted to communicate my findings and recommendations. Patient Instructions       ASSESSMENT/PLAN:   Diagnosis Orders   1. Obstructive sleep apnea (adult) (pediatric)          Sleep study qualifying for inspire done June 2022      AHI of 19.7    BMI of 27        Drug-induced sleep endoscopy  Date of Procedure: 11/14/22  Time: 0830     Pre Operative Diagnoses: Obstructive Sleep Apnea  Post Operative Diagnoses:  Obstructive Sleep Apnea           Procedure:  1. Drug Induced Sleep endoscopy (04630)       Surgeon: Megha Charles DO     In the hypopharynx, a very large, tongue base is observed in complete anterior-posterior retrolingual/retroepiglottic obstruction. In summary, there is no evidence of complete concentric palatal obstruction and does appear to be a candidate anatomically for hypoglossal nerve stimulation therapy.              Inspire implantation done  Date of Operation:   01/16/23     Pre-operative Diagnosis:   OBSTRUCTIVE SLEEP APNEA      Post-operative Diagnosis:   * No post-op diagnosis entered *      Operative Procedure:   Procedure(s):  PLACEMENT OS INSPIRE (N/A)         Attending Surgeon:   Megha Charles DO        Patient has come on 2/28/2023 after inspire implantation for activation  Still having some neuropraxia  We will have the patient come back in about 4 to 6 weeks                   Thank you for allowing me to assist in the care of the [facility], MD Mehnaz Graves MD

## 2023-04-05 ENCOUNTER — TELEPHONE (OUTPATIENT)
Dept: PULMONOLOGY | Age: 43
End: 2023-04-05

## 2023-04-05 NOTE — TELEPHONE ENCOUNTER
Called PT to confirm 4/6 procedure appt. Pt stated her tongue is not healed and needs to reschedule. Please call PT and advise and reschedule.

## 2023-04-06 NOTE — TELEPHONE ENCOUNTER
Patient called back and I took her information to see if I could help her today   Upon looking in the chart patient is an inspire patient and was annoyed that I could not schedule her appointment   I explained in detail We have a Medical Assistant in charge of Shira Mcmanus and that she is the only one who can schedule  She state\" That make No Sense\" I apologized and informed patient The medical Asst will be in contact today

## 2023-05-09 ENCOUNTER — PROCEDURE VISIT (OUTPATIENT)
Dept: PULMONOLOGY | Age: 43
End: 2023-05-09
Payer: COMMERCIAL

## 2023-05-09 DIAGNOSIS — G47.33 OBSTRUCTIVE SLEEP APNEA (ADULT) (PEDIATRIC): Primary | ICD-10-CM

## 2023-05-09 PROCEDURE — 95977 ALYS CPLX CN NPGT PRGRMG: CPT | Performed by: INTERNAL MEDICINE

## 2023-05-09 PROCEDURE — 99215 OFFICE O/P EST HI 40 MIN: CPT | Performed by: INTERNAL MEDICINE

## 2023-05-09 ASSESSMENT — ENCOUNTER SYMPTOMS
RESPIRATORY NEGATIVE: 1
ALLERGIC/IMMUNOLOGIC NEGATIVE: 1
EYES NEGATIVE: 1
GASTROINTESTINAL NEGATIVE: 1

## 2023-05-09 NOTE — PROGRESS NOTES
MA Communication:   The following orders are received by verbal communication from Quincy Hilton MD    Orders include:  2 wk fu call scheduled 5/23/23       6 wk fu scheduled 7/18/23 (had to push out to about 8 weeks)

## 2023-05-09 NOTE — PROGRESS NOTES
Sadie Mesa (: 1980 ) is a 36 y.o. female here for an evaluation of No chief complaint on file. ASSESSMENT/PLAN:   Diagnosis Orders   1. Obstructive sleep apnea (adult) (pediatric)          Sleep study qualifying for inspire done 2022      AHI of 19.7    BMI of 27        Drug-induced sleep endoscopy  Date of Procedure: 22  Time: 0830     Pre Operative Diagnoses: Obstructive Sleep Apnea  Post Operative Diagnoses:  Obstructive Sleep Apnea           Procedure:  1. Drug Induced Sleep endoscopy (28813)       Surgeon: Elvin Espitia DO     In the hypopharynx, a very large, tongue base is observed in complete anterior-posterior retrolingual/retroepiglottic obstruction. In summary, there is no evidence of complete concentric palatal obstruction and does appear to be a candidate anatomically for hypoglossal nerve stimulation therapy. Inspire implantation done  Date of Operation:   23     Pre-operative Diagnosis:   OBSTRUCTIVE SLEEP APNEA      Post-operative Diagnosis:   * No post-op diagnosis entered *      Operative Procedure:   Procedure(s):  PLACEMENT OS INSPIRE (N/A)         Attending Surgeon:   Elvin Espitia DO        Patient has come on 2023 after inspire implantation for activation  Still having some neuropraxia  We will have the patient come back in about 4 to 6 weeks      Follow-up visit on 2023  Neurostimulator Reprogramming  Approximately 20 minutes was spent analyzing the airway and programming the device. Sensing voltage:  1.5 volts  Starting amplitude: 1.8 volts with the default [+ - +] electrode configuration. Starting Range:  Lower Limit: 1.8 volts  Upper Limit: 2.8 volts            Rate: Freq 33 hz  And 90 pulse width  Amplitude 1.8    Start delay 60 minutes  Pause delay 30 minutes  Duration 8 hours      Continue to increase the level up by 1 level point every 7 days, adjust to comfort, if it feels uncomfortable drop it back by 1 level.     The

## 2023-05-23 ENCOUNTER — TELEPHONE (OUTPATIENT)
Dept: PULMONOLOGY | Age: 43
End: 2023-05-23

## 2023-05-23 NOTE — TELEPHONE ENCOUNTER
Pt was activated 3 weeks ago (sx was back in January 2023). I LVM asking pt to call me back tomorrow as phones are off now. The INSPIRE should not be causing any pain in her chest or arm and I believe this is caused by another reason. However, I will wait and s/w patient tomorrow to get more clarification on what is going on.

## 2023-05-23 NOTE — TELEPHONE ENCOUNTER
PT called said she just got Inspire 3 weeks ago and is experiencing chest and arm pain on her right side and was wondering if it was normal. Please call PT and advise.

## 2023-05-24 ENCOUNTER — TELEPHONE (OUTPATIENT)
Dept: PULMONOLOGY | Age: 43
End: 2023-05-24

## 2023-05-24 NOTE — TELEPHONE ENCOUNTER
Can you check with Megan Tan or Rossana Waldrop and find out if this patient's surgery had any issues    Also how should we approach, normally if there is a problem I usually refer back to ENT

## 2023-05-24 NOTE — TELEPHONE ENCOUNTER
Are you using Inspire every night? Yes     2. How many steps have you stepped up?  2     3. Are you more rested during the day? No     4. Follow up appointment with Dr. Indira Hollins is scheduled for:   6/20/23    5. Additional questions or concerns:   See other phone note and has other sleep issues. Will discuss with Dr. Indira Hollins.

## 2023-05-24 NOTE — TELEPHONE ENCOUNTER
I s/w patient. She said since she had the INSPIRE activated it is sore under the device from her chest and goes to her arm. It feels muscular and it lasts pretty much all day. It is not \"sore to touch\" just sore around the area. She is very frustrated because it is bothering her so much. I offered her an appt tomorrow but she could not come due to work. I told pt I had not heard of that response before and that I would forward this message to Dr. Sindhu Gilliland to advise.

## 2023-05-25 NOTE — TELEPHONE ENCOUNTER
Mena Davila. He has placed a call to the help line at [facility] to see if they have any ideas about this. He will let me know what he finds out.     LM informing pt of status

## 2023-06-20 ENCOUNTER — OFFICE VISIT (OUTPATIENT)
Dept: PULMONOLOGY | Age: 43
End: 2023-06-20
Payer: COMMERCIAL

## 2023-06-20 DIAGNOSIS — G47.00 INSOMNIA, UNSPECIFIED TYPE: ICD-10-CM

## 2023-06-20 DIAGNOSIS — G47.33 OBSTRUCTIVE SLEEP APNEA (ADULT) (PEDIATRIC): Primary | ICD-10-CM

## 2023-06-20 DIAGNOSIS — Z96.82 S/P INSERTION OF HYPOGLOSSAL NERVE STIMULATOR: ICD-10-CM

## 2023-06-20 PROCEDURE — 95976 ALYS SMPL CN NPGT PRGRMG: CPT | Performed by: INTERNAL MEDICINE

## 2023-06-20 PROCEDURE — 99215 OFFICE O/P EST HI 40 MIN: CPT | Performed by: INTERNAL MEDICINE

## 2023-06-20 RX ORDER — ZOLPIDEM TARTRATE 10 MG/1
10 TABLET ORAL NIGHTLY PRN
Qty: 30 TABLET | Refills: 3 | Status: SHIPPED | OUTPATIENT
Start: 2023-06-20 | End: 2023-07-20

## 2023-06-20 ASSESSMENT — ENCOUNTER SYMPTOMS
EYES NEGATIVE: 1
GASTROINTESTINAL NEGATIVE: 1
RESPIRATORY NEGATIVE: 1
ALLERGIC/IMMUNOLOGIC NEGATIVE: 1

## 2023-06-20 NOTE — PATIENT INSTRUCTIONS
ASSESSMENT/PLAN:   Diagnosis Orders   1. Obstructive sleep apnea (adult) (pediatric)        2. Insomnia, unspecified type        3. S/P insertion of hypoglossal nerve stimulator          Sleep study qualifying for inspire done June 2022      AHI of 19.7    BMI of 27        Drug-induced sleep endoscopy  Date of Procedure: 11/14/22  Time: 0830     Pre Operative Diagnoses: Obstructive Sleep Apnea  Post Operative Diagnoses:  Obstructive Sleep Apnea           Procedure:  1. Drug Induced Sleep endoscopy (49601)       Surgeon: Deidra Burger DO     In the hypopharynx, a very large, tongue base is observed in complete anterior-posterior retrolingual/retroepiglottic obstruction. In summary, there is no evidence of complete concentric palatal obstruction and does appear to be a candidate anatomically for hypoglossal nerve stimulation therapy. Inspire implantation done  Date of Operation:   01/16/23     Pre-operative Diagnosis:   OBSTRUCTIVE SLEEP APNEA      Post-operative Diagnosis:   * No post-op diagnosis entered *      Operative Procedure:   Procedure(s):  PLACEMENT OS INSPIRE (N/A)         Attending Surgeon:   Deidra Burger DO        Patient has come on 2/28/2023 after inspire implantation for activation  Still having some neuropraxia  We will have the patient come back in about 4 to 6 weeks      Follow-up visit on 5/9/2023  Neurostimulator Reprogramming  Approximately 20 minutes was spent analyzing the airway and programming the device. Sensing voltage:  1.5 volts  Starting amplitude: 1.8 volts with the default [+ - +] electrode configuration. Starting Range:  Lower Limit: 1.8 volts  Upper Limit: 2.8 volts            Rate: Freq 33 hz  And 90 pulse width  Amplitude 1.8    Start delay 60 minutes  Pause delay 30 minutes  Duration 8 hours      Continue to increase the level up by 1 level point every 7 days, adjust to comfort, if it feels uncomfortable drop it back by 1 level.     The patient will be called

## 2023-06-20 NOTE — PROGRESS NOTES
times  But having issues with insomnia  Tried melatonin/benadryl    Overall having issues with the insomnia          Review of Systems   Constitutional: Negative. HENT: Negative. Eyes: Negative. Respiratory: Negative. Cardiovascular: Negative. Gastrointestinal: Negative. Endocrine: Negative. Genitourinary: Negative. Musculoskeletal: Negative. Skin: Negative. Allergic/Immunologic: Negative. Neurological: Negative. Hematological: Negative. Psychiatric/Behavioral: Negative. There were no vitals filed for this visit. Physical Exam  Vitals and nursing note reviewed. Constitutional:       General: She is not in acute distress. Appearance: Normal appearance. She is not ill-appearing. HENT:      Head: Normocephalic and atraumatic. Right Ear: External ear normal.      Left Ear: External ear normal.      Nose: Nose normal.      Mouth/Throat:      Mouth: Mucous membranes are moist.      Pharynx: Oropharynx is clear. Comments: Mallampati 3  Inspire incision at the jaw is clean and well-healed  Eyes:      General: No scleral icterus. Extraocular Movements: Extraocular movements intact. Conjunctiva/sclera: Conjunctivae normal.      Pupils: Pupils are equal, round, and reactive to light. Cardiovascular:      Rate and Rhythm: Normal rate and regular rhythm. Pulses: Normal pulses. Heart sounds: Normal heart sounds. No murmur heard. No friction rub. Pulmonary:      Effort: No respiratory distress. Breath sounds: No wheezing or rales. Comments: Inspire incision on the chest wall, right side, clean and well-healed  Abdominal:      General: Abdomen is flat. Bowel sounds are normal. There is no distension. Tenderness: There is no abdominal tenderness. There is no guarding. Musculoskeletal:         General: No swelling or tenderness. Normal range of motion. Cervical back: Normal range of motion and neck supple.  No

## 2023-07-05 ENCOUNTER — TELEPHONE (OUTPATIENT)
Dept: PULMONOLOGY | Age: 43
End: 2023-07-05

## 2023-07-14 NOTE — TELEPHONE ENCOUNTER
LM asking patient to return call to office.     I will wait for patient to either call me back or see her at her [address] [facility] on 8/10/23

## 2023-08-10 ENCOUNTER — TELEPHONE (OUTPATIENT)
Dept: PULMONOLOGY | Age: 43
End: 2023-08-10

## 2023-08-10 NOTE — TELEPHONE ENCOUNTER
Patient cancelled appointment on 8/10/23 with Dr. Christopher Fox for INSPIRE 6 wk fu.    Reason: Unknown    Patient did not reschedule appointment.     Appointment rescheduled:    [facility]

## 2023-08-30 ENCOUNTER — TELEPHONE (OUTPATIENT)
Dept: PULMONOLOGY | Age: 43
End: 2023-08-30

## 2023-09-12 ENCOUNTER — OFFICE VISIT (OUTPATIENT)
Dept: PULMONOLOGY | Age: 43
End: 2023-09-12
Payer: COMMERCIAL

## 2023-09-12 DIAGNOSIS — Z96.82 S/P INSERTION OF HYPOGLOSSAL NERVE STIMULATOR: ICD-10-CM

## 2023-09-12 DIAGNOSIS — G47.33 OBSTRUCTIVE SLEEP APNEA (ADULT) (PEDIATRIC): Primary | ICD-10-CM

## 2023-09-12 DIAGNOSIS — G47.00 INSOMNIA, UNSPECIFIED TYPE: ICD-10-CM

## 2023-09-12 PROCEDURE — 99215 OFFICE O/P EST HI 40 MIN: CPT | Performed by: INTERNAL MEDICINE

## 2023-09-12 PROCEDURE — 95976 ALYS SMPL CN NPGT PRGRMG: CPT | Performed by: INTERNAL MEDICINE

## 2023-09-12 RX ORDER — CLONAZEPAM 1 MG/1
1 TABLET ORAL NIGHTLY
Qty: 60 TABLET | Refills: 1 | Status: SHIPPED | OUTPATIENT
Start: 2023-09-12 | End: 2023-10-12

## 2023-09-12 ASSESSMENT — ENCOUNTER SYMPTOMS
RESPIRATORY NEGATIVE: 1
EYES NEGATIVE: 1
ALLERGIC/IMMUNOLOGIC NEGATIVE: 1
GASTROINTESTINAL NEGATIVE: 1

## 2023-09-12 NOTE — PROGRESS NOTES
Haile Potts (: 1980 ) is a 36 y.o. female here for an evaluation of No chief complaint on file. ASSESSMENT/PLAN:   Diagnosis Orders   1. Obstructive sleep apnea (adult) (pediatric)        2. S/P insertion of hypoglossal nerve stimulator        3. Insomnia, unspecified type          Sleep study qualifying for inspire done 2022      AHI of 19.7    BMI of 27        Drug-induced sleep endoscopy  Date of Procedure: 22  Time: 0830     Pre Operative Diagnoses: Obstructive Sleep Apnea  Post Operative Diagnoses:  Obstructive Sleep Apnea           Procedure:  1. Drug Induced Sleep endoscopy (72035)       Surgeon: Kelli De Oliveira DO     In the hypopharynx, a very large, tongue base is observed in complete anterior-posterior retrolingual/retroepiglottic obstruction. In summary, there is no evidence of complete concentric palatal obstruction and does appear to be a candidate anatomically for hypoglossal nerve stimulation therapy. Inspire implantation done  Date of Operation:   23     Pre-operative Diagnosis:   OBSTRUCTIVE SLEEP APNEA      Post-operative Diagnosis:   * No post-op diagnosis entered *      Operative Procedure:   Procedure(s):  PLACEMENT OS INSPIRE (N/A)         Attending Surgeon:   Kelli De Oliveira DO        Patient has come on 2023 after inspire implantation for activation  Still having some neuropraxia  We will have the patient come back in about 4 to 6 weeks      Follow-up visit on 2023  Neurostimulator Reprogramming  Approximately 20 minutes was spent analyzing the airway and programming the device. Sensing voltage:  1.5 volts  Starting amplitude: 1.8 volts with the default [+ - +] electrode configuration.   Starting Range:  Lower Limit: 1.8 volts  Upper Limit: 2.8 volts            Rate: Freq 33 hz  And 90 pulse width  Amplitude 1.8    Start delay 60 minutes  Pause delay 30 minutes  Duration 8 hours      Continue to increase the level up by 1 level point

## 2023-09-14 NOTE — PROGRESS NOTES
MA Communication: The following orders are received by verbal communication from Samanta Fox MD    Orders include:  OV 9/12   V 2.4 (L3) (2.1-3.1) ; +-+ ; 60/15/8 ;  H 60    FU in 4-6 weeks  11/24 @ 11:00

## 2023-10-12 DIAGNOSIS — G47.00 INSOMNIA, UNSPECIFIED TYPE: Primary | ICD-10-CM

## 2023-10-12 NOTE — TELEPHONE ENCOUNTER
Last office visit 9/12/2023     Last written 6/20/23     Next office visit scheduled 10/24/2023    Requested Prescriptions     Pending Prescriptions Disp Refills    zolpidem (AMBIEN) 10 MG tablet [Pharmacy Med Name: ZOLPIDEM TARTRATE 10 MG TABLET] 30 tablet 0     Sig: TAKE 1 TABLET BY MOUTH NIGHTLY AS NEEDED FOR SLEEP FOR UP TO 30 DAYS. MAX DAILY AMOUNT: 10 MG.

## 2023-10-16 RX ORDER — ZOLPIDEM TARTRATE 10 MG/1
TABLET ORAL
Qty: 30 TABLET | Refills: 1 | Status: SHIPPED | OUTPATIENT
Start: 2023-10-16 | End: 2023-11-15

## 2023-12-14 ENCOUNTER — OFFICE VISIT (OUTPATIENT)
Dept: PULMONOLOGY | Age: 43
End: 2023-12-14
Payer: COMMERCIAL

## 2023-12-14 DIAGNOSIS — G47.33 OBSTRUCTIVE SLEEP APNEA (ADULT) (PEDIATRIC): Primary | ICD-10-CM

## 2023-12-14 DIAGNOSIS — G47.00 INSOMNIA, UNSPECIFIED TYPE: ICD-10-CM

## 2023-12-14 DIAGNOSIS — Z96.82 S/P INSERTION OF HYPOGLOSSAL NERVE STIMULATOR: ICD-10-CM

## 2023-12-14 PROCEDURE — 95976 ALYS SMPL CN NPGT PRGRMG: CPT | Performed by: INTERNAL MEDICINE

## 2023-12-14 PROCEDURE — 99214 OFFICE O/P EST MOD 30 MIN: CPT | Performed by: INTERNAL MEDICINE

## 2023-12-14 RX ORDER — ZOLPIDEM TARTRATE 10 MG/1
10 TABLET ORAL NIGHTLY PRN
Qty: 30 TABLET | Refills: 2 | Status: SHIPPED | OUTPATIENT
Start: 2023-12-14 | End: 2024-01-13

## 2023-12-14 NOTE — PATIENT INSTRUCTIONS
ASSESSMENT/PLAN:   Diagnosis Orders   1. Obstructive sleep apnea (adult) (pediatric)        2. S/P insertion of hypoglossal nerve stimulator        3. Insomnia, unspecified type          Sleep study qualifying for inspire done June 2022      AHI of 19.7    BMI of 27        Drug-induced sleep endoscopy  Date of Procedure: 11/14/22  Time: 0830     Pre Operative Diagnoses: Obstructive Sleep Apnea  Post Operative Diagnoses:  Obstructive Sleep Apnea           Procedure:  1. Drug Induced Sleep endoscopy (95058)       Surgeon: Robyn Perez DO     In the hypopharynx, a very large, tongue base is observed in complete anterior-posterior retrolingual/retroepiglottic obstruction. In summary, there is no evidence of complete concentric palatal obstruction and does appear to be a candidate anatomically for hypoglossal nerve stimulation therapy. Inspire implantation done  Date of Operation:   01/16/23     Pre-operative Diagnosis:   OBSTRUCTIVE SLEEP APNEA      Post-operative Diagnosis:   * No post-op diagnosis entered *      Operative Procedure:   Procedure(s):  PLACEMENT OS INSPIRE (N/A)         Attending Surgeon:   Robyn Perez DO        Patient has come on 2/28/2023 after inspire implantation for activation  Still having some neuropraxia  We will have the patient come back in about 4 to 6 weeks      Follow-up visit on 5/9/2023  Neurostimulator Reprogramming  Approximately 20 minutes was spent analyzing the airway and programming the device. Sensing voltage:  1.5 volts  Starting amplitude: 1.8 volts with the default [+ - +] electrode configuration. Starting Range:  Lower Limit: 1.8 volts  Upper Limit: 2.8 volts            Rate: Freq 33 hz  And 90 pulse width  Amplitude 1.8    Start delay 60 minutes  Pause delay 30 minutes  Duration 8 hours      Continue to increase the level up by 1 level point every 7 days, adjust to comfort, if it feels uncomfortable drop it back by 1 level.     The patient will be called

## 2023-12-14 NOTE — PROGRESS NOTES
insomnia  Tried melatonin/benadryl    Overall having issues with the insomnia        Follow-up on 12/14/2023  Patient doing well  No nausea or vomiting  Tolerating the pressure the voltage  Sleeping through the night  Subjectively the patient is feeling a little bit better  Objectively patient's boyfriend says that she does not snore when she uses it and that he does know when she fails to use it and has recommended that she use it all the time  No issues with nerve palsy or tongue soreness          Review of Systems   Constitutional: Negative. HENT: Negative. Eyes: Negative. Respiratory: Negative. Cardiovascular: Negative. Gastrointestinal: Negative. Endocrine: Negative. Genitourinary: Negative. Musculoskeletal: Negative. Skin: Negative. Allergic/Immunologic: Negative. Neurological: Negative. Hematological: Negative. Psychiatric/Behavioral: Negative. There were no vitals filed for this visit. Physical Exam  Vitals and nursing note reviewed. Constitutional:       General: She is not in acute distress. Appearance: Normal appearance. She is not ill-appearing. HENT:      Head: Normocephalic and atraumatic. Right Ear: External ear normal.      Left Ear: External ear normal.      Nose: Nose normal.      Mouth/Throat:      Mouth: Mucous membranes are moist.      Pharynx: Oropharynx is clear. Comments: Mallampati 3  Inspire incision at the jaw is clean and well-healed  Eyes:      General: No scleral icterus. Extraocular Movements: Extraocular movements intact. Conjunctiva/sclera: Conjunctivae normal.      Pupils: Pupils are equal, round, and reactive to light. Cardiovascular:      Rate and Rhythm: Normal rate and regular rhythm. Pulses: Normal pulses. Heart sounds: Normal heart sounds. No murmur heard. No friction rub. Pulmonary:      Effort: No respiratory distress. Breath sounds: No wheezing or rales.       Comments:

## 2024-02-19 ENCOUNTER — TELEPHONE (OUTPATIENT)
Dept: PULMONOLOGY | Age: 44
End: 2024-02-19

## 2024-02-19 NOTE — TELEPHONE ENCOUNTER
Patient called because they canceled her sleep study for today after she took the day off and she has not rescheduled because she has to get it approved through work again. She wanted you to know. She wasn't happy because it was cx because of a tech not being there. Thanks

## 2024-03-06 DIAGNOSIS — G47.00 INSOMNIA, UNSPECIFIED TYPE: ICD-10-CM

## 2024-03-06 RX ORDER — ZOLPIDEM TARTRATE 10 MG/1
10 TABLET ORAL NIGHTLY PRN
Qty: 30 TABLET | Refills: 2 | Status: SHIPPED | OUTPATIENT
Start: 2024-03-06 | End: 2024-04-05

## 2024-03-20 ENCOUNTER — HOSPITAL ENCOUNTER (OUTPATIENT)
Dept: SLEEP CENTER | Age: 44
Discharge: HOME | End: 2024-03-22
Payer: COMMERCIAL

## 2024-03-20 DIAGNOSIS — G47.33 OBSTRUCTIVE SLEEP APNEA (ADULT) (PEDIATRIC): ICD-10-CM

## 2024-03-20 DIAGNOSIS — Z96.82 S/P INSERTION OF HYPOGLOSSAL NERVE STIMULATOR: ICD-10-CM

## 2024-03-20 PROCEDURE — 95810 POLYSOM 6/> YRS 4/> PARAM: CPT

## 2024-04-04 ENCOUNTER — OFFICE VISIT (OUTPATIENT)
Dept: PULMONOLOGY | Age: 44
End: 2024-04-04
Payer: COMMERCIAL

## 2024-04-04 DIAGNOSIS — G47.33 OBSTRUCTIVE SLEEP APNEA (ADULT) (PEDIATRIC): Primary | ICD-10-CM

## 2024-04-04 DIAGNOSIS — Z96.82 S/P INSERTION OF HYPOGLOSSAL NERVE STIMULATOR: ICD-10-CM

## 2024-04-04 DIAGNOSIS — G47.00 INSOMNIA, UNSPECIFIED TYPE: ICD-10-CM

## 2024-04-04 PROCEDURE — 95976 ALYS SMPL CN NPGT PRGRMG: CPT | Performed by: INTERNAL MEDICINE

## 2024-04-04 PROCEDURE — 99215 OFFICE O/P EST HI 40 MIN: CPT | Performed by: INTERNAL MEDICINE

## 2024-04-04 RX ORDER — ZOLPIDEM TARTRATE 10 MG/1
10 TABLET ORAL NIGHTLY PRN
Qty: 30 TABLET | Refills: 2 | Status: SHIPPED | OUTPATIENT
Start: 2024-04-04 | End: 2024-05-04

## 2024-04-04 ASSESSMENT — ENCOUNTER SYMPTOMS
GASTROINTESTINAL NEGATIVE: 1
RESPIRATORY NEGATIVE: 1
EYES NEGATIVE: 1
ALLERGIC/IMMUNOLOGIC NEGATIVE: 1

## 2024-04-04 NOTE — PATIENT INSTRUCTIONS
ASSESSMENT/PLAN:   Diagnosis Orders   1. Obstructive sleep apnea (adult) (pediatric)        2. S/P insertion of hypoglossal nerve stimulator        3. Insomnia, unspecified type          Sleep study qualifying for inspire done June 2022      AHI of 19.7    BMI of 27        Drug-induced sleep endoscopy  Date of Procedure: 11/14/22  Time: 0830     Pre Operative Diagnoses: Obstructive Sleep Apnea  Post Operative Diagnoses:  Obstructive Sleep Apnea           Procedure:  1. 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  In summary, there is no evidence of complete concentric palatal obstruction and does appear to be a candidate anatomically for hypoglossal nerve stimulation therapy.             Inspire implantation done  Date of Operation:   01/16/23     Pre-operative Diagnosis:   OBSTRUCTIVE SLEEP APNEA      Post-operative Diagnosis:   * No post-op diagnosis entered *      Operative Procedure:   Procedure(s):  PLACEMENT OS INSPIRE (N/A)         Attending Surgeon:   Criselda Hancock DO        Patient has come on 2/28/2023 after inspire implantation for activation  Still having some neuropraxia  We will have the patient come back in about 4 to 6 weeks      Follow-up visit on 5/9/2023  Neurostimulator Reprogramming  Approximately 20 minutes was spent analyzing the airway and programming the device.  Sensing voltage:  1.5 volts  Starting amplitude: 1.8 volts with the default [+ - +] electrode configuration.  Starting Range:  Lower Limit: 1.8 volts  Upper Limit: 2.8 volts            Rate: Freq 33 hz  And 90 pulse width  Amplitude 1.8    Start delay 60 minutes  Pause delay 30 minutes  Duration 8 hours      Continue to increase the level up by 1 level point every 7 days, adjust to comfort, if it feels uncomfortable drop it back by 1 level.    The patient will be called

## 2024-04-04 NOTE — PROGRESS NOTES
Musculoskeletal:         General: No swelling or tenderness. Normal range of motion.      Cervical back: Normal range of motion and neck supple. No rigidity.   Skin:     General: Skin is warm and dry.      Coloration: Skin is not jaundiced.   Neurological:      General: No focal deficit present.      Mental Status: She is alert and oriented to person, place, and time. Mental status is at baseline.      Cranial Nerves: No cranial nerve deficit.      Sensory: No sensory deficit.      Motor: No weakness.      Gait: Gait normal.   Psychiatric:         Mood and Affect: Mood normal.         Thought Content: Thought content normal.         Judgment: Judgment normal.              URSZULA WRIGHT MD

## 2024-06-27 ENCOUNTER — OFFICE VISIT (OUTPATIENT)
Dept: PULMONOLOGY | Age: 44
End: 2024-06-27
Payer: COMMERCIAL

## 2024-06-27 VITALS
OXYGEN SATURATION: 94 % | HEART RATE: 82 BPM | WEIGHT: 142 LBS | DIASTOLIC BLOOD PRESSURE: 76 MMHG | SYSTOLIC BLOOD PRESSURE: 109 MMHG | HEIGHT: 64 IN | BODY MASS INDEX: 24.24 KG/M2 | RESPIRATION RATE: 16 BRPM | TEMPERATURE: 97.5 F

## 2024-06-27 DIAGNOSIS — F51.01 PRIMARY INSOMNIA: Primary | ICD-10-CM

## 2024-06-27 PROCEDURE — 99215 OFFICE O/P EST HI 40 MIN: CPT | Performed by: INTERNAL MEDICINE

## 2024-06-27 RX ORDER — FLUTICASONE PROPIONATE 50 MCG
2 SPRAY, SUSPENSION (ML) NASAL DAILY
Qty: 48 G | Refills: 1 | Status: SHIPPED | OUTPATIENT
Start: 2024-06-27

## 2024-06-27 RX ORDER — ROSUVASTATIN CALCIUM 10 MG/1
TABLET, COATED ORAL
COMMUNITY
Start: 2024-06-10

## 2024-06-27 RX ORDER — ZOLPIDEM TARTRATE 5 MG/1
TABLET ORAL
Qty: 30 TABLET | Refills: 1 | Status: SHIPPED | OUTPATIENT
Start: 2024-06-27 | End: 2024-09-25

## 2024-06-27 RX ORDER — CLONAZEPAM 0.5 MG/1
0.5 TABLET ORAL 3 TIMES DAILY PRN
COMMUNITY

## 2024-06-27 RX ORDER — DEXTROMETHORPHAN HYDROBROMIDE, BUPROPION HYDROCHLORIDE 105; 45 MG/1; MG/1
1 TABLET, MULTILAYER, EXTENDED RELEASE ORAL 2 TIMES DAILY
COMMUNITY

## 2024-06-27 RX ORDER — SPIRONOLACTONE 100 MG/1
TABLET, FILM COATED ORAL
COMMUNITY
Start: 2024-05-29

## 2024-06-27 ASSESSMENT — SLEEP AND FATIGUE QUESTIONNAIRES
HOW LIKELY ARE YOU TO NOD OFF OR FALL ASLEEP WHILE LYING DOWN TO REST IN THE AFTERNOON WHEN CIRCUMSTANCES PERMIT: SLIGHT CHANCE OF DOZING
HOW LIKELY ARE YOU TO NOD OFF OR FALL ASLEEP WHILE SITTING AND TALKING TO SOMEONE: WOULD NEVER DOZE
HOW LIKELY ARE YOU TO NOD OFF OR FALL ASLEEP WHILE SITTING QUIETLY AFTER LUNCH WITHOUT ALCOHOL: SLIGHT CHANCE OF DOZING
HOW LIKELY ARE YOU TO NOD OFF OR FALL ASLEEP WHILE SITTING AND TALKING TO SOMEONE: WOULD NEVER DOZE
HOW LIKELY ARE YOU TO NOD OFF OR FALL ASLEEP WHILE SITTING INACTIVE IN A PUBLIC PLACE: SLIGHT CHANCE OF DOZING
HOW LIKELY ARE YOU TO NOD OFF OR FALL ASLEEP WHILE SITTING QUIETLY AFTER LUNCH WITHOUT ALCOHOL: SLIGHT CHANCE OF DOZING
HOW LIKELY ARE YOU TO NOD OFF OR FALL ASLEEP WHILE LYING DOWN TO REST IN THE AFTERNOON WHEN CIRCUMSTANCES PERMIT: SLIGHT CHANCE OF DOZING
HOW LIKELY ARE YOU TO NOD OFF OR FALL ASLEEP WHILE SITTING AND READING: MODERATE CHANCE OF DOZING
HOW LIKELY ARE YOU TO NOD OFF OR FALL ASLEEP WHILE SITTING AND READING: MODERATE CHANCE OF DOZING
ESS TOTAL SCORE: 7
HOW LIKELY ARE YOU TO NOD OFF OR FALL ASLEEP IN A CAR, WHILE STOPPED FOR A FEW MINUTES IN TRAFFIC: WOULD NEVER DOZE
HOW LIKELY ARE YOU TO NOD OFF OR FALL ASLEEP IN A CAR, WHILE STOPPED FOR A FEW MINUTES IN TRAFFIC: WOULD NEVER DOZE
HOW LIKELY ARE YOU TO NOD OFF OR FALL ASLEEP WHEN YOU ARE A PASSENGER IN A CAR FOR AN HOUR WITHOUT A BREAK: SLIGHT CHANCE OF DOZING
HOW LIKELY ARE YOU TO NOD OFF OR FALL ASLEEP WHILE WATCHING TV: SLIGHT CHANCE OF DOZING
HOW LIKELY ARE YOU TO NOD OFF OR FALL ASLEEP WHILE WATCHING TV: SLIGHT CHANCE OF DOZING
HOW LIKELY ARE YOU TO NOD OFF OR FALL ASLEEP WHILE SITTING INACTIVE IN A PUBLIC PLACE: SLIGHT CHANCE OF DOZING
HOW LIKELY ARE YOU TO NOD OFF OR FALL ASLEEP WHEN YOU ARE A PASSENGER IN A CAR FOR AN HOUR WITHOUT A BREAK: SLIGHT CHANCE OF DOZING
NECK CIRCUMFERENCE (INCHES): 13

## 2024-06-27 NOTE — PATIENT INSTRUCTIONS
For insomnia: a cognitive behavioral program is the most effective intervention and will help almost everyone with insomnia:  I recommend trying the [facility] Go! To Sleep! Program for insomnia  [email or web address]       Remember to bring a list of pulmonary medications and any CPAP or BiPAP machines to your next appointment with the office.     Please keep all of your future appointments scheduled by [facility] office. Out of respect for other patients and providers, you may be asked to reschedule your appointment if you arrive later than your scheduled appointment time. Appointments cancelled less than 24hrs in advance will be considered a no show. Patients with three missed appointments within 1 year or four missed appointments within 2 years can be dismissed from the practice.     Please be aware that our physicians are required to work in the Intensive Care Unit at [facility].  Your appointment may need to be rescheduled if they are designated to work during your appointment time.      You may receive a survey regarding the care you received during your visit.  Your input is valuable to us.  We encourage you to complete and return your survey.  We hope you will choose us in the future for your healthcare needs.     Pt instructed of all future appointment dates & times, including radiology, labs, procedures & referrals. If procedures were scheduled preparation instructions provided. Instructions on future appointments with [facility] were given.      In the next few weeks, you will be receiving a survey from [facility] regarding your visit today.  We would greatly appreciate it if you would take just a few minutes to fill that out.  It is very important to us that our patients receive top notch care and our surveys help keep us accountable. However, if your experience was not a good one, we want to hear about

## 2024-06-27 NOTE — PROGRESS NOTES
MA Communication:  The following orders are received by verbal communication from Narciso Murphy MD    Orders include:  Inspire 1 year.    
Normal heart sounds.  No lower extremity edema.  Pulmonary/Chest: No wheezes. No rhonchi. No rales. No decreased breath sounds.  No accessory muscle usage or stridor.   Musculoskeletal: No cyanosis. No clubbing.  Skin: Skin is warm and dry.  The surgical sites for Inspire implantation are clean, dry and intact.    Psychiatric: Anxious, appropriate, thought processes linear and goal-directed     Data:   HST 6/17/2022 AHI 19.7, BMI 27  PSG inspire titration 3/20/2024 therapeutic at 2.2-2.3 with AHI 0.9-3.8    Assessment:  Obstructive Sleep Apnea   Inspire implantation on 1/16/2023, Hancock  Inspire activation on 5/9/2023  Insomnia, multifactorial  Failed melatonin at high doses, Benadryl at high dose, Klonopin 1 mg  Has had some subjective benefit from Ambien 10 mg, but it does not work as well as it did previously  Generalized anxiety disorder  Using Klonopin 0.5 up to 3 times daily  Ear fullness, suspected seasonal allergic symptoms    Plan:   Continue inspire amplitude 2.4, level 5, range 2-2.6  Follow-up with me in 1 year for inspire  For insomnia, CBT-I is recommended   I am not willing to prescribe Ambien long-term for use in a patient who is using Klonopin multiple times a day.  I explained this to the patient and told her that we would begin weaning her off the Ambien as I suspect she will get worsening of her insomnia as a rebound phenomenon.  She is going to decrease the Ambien from 10 mg to 5 mg for the next 30 days, and then cut the Ambien tablets in half (2.5 mg) for the following month.  She is cautioned against using clonazepam within 8 hours of her last dose of Ambien, similarly she is cautioned against using Ambien within 8 hours of the last dose of clonazepam.  Driving is not advised on the day following Ambien dosing.    I gave her the session 1 handout for CBT-I.  I offered her follow-up in 2 to 3 weeks with Smita for session to CBT-I.  She is given a sleep log with instructions on filling it out

## 2024-12-06 RX ORDER — FLUTICASONE PROPIONATE 50 MCG
2 SPRAY, SUSPENSION (ML) NASAL DAILY
Qty: 1 EACH | Refills: 5 | Status: SHIPPED | OUTPATIENT
Start: 2024-12-06

## 2025-04-07 ENCOUNTER — TRANSCRIBE ORDERS (OUTPATIENT)
Dept: ADMINISTRATIVE | Age: 45
End: 2025-04-07

## 2025-04-07 DIAGNOSIS — M75.32 CALCIFIC TENDINITIS OF LEFT SHOULDER: Primary | ICD-10-CM

## 2025-04-28 ENCOUNTER — HOSPITAL ENCOUNTER (OUTPATIENT)
Dept: MRI IMAGING | Age: 45
Discharge: HOME | End: 2025-04-28
Payer: COMMERCIAL

## 2025-04-28 DIAGNOSIS — M75.32 CALCIFIC TENDINITIS OF LEFT SHOULDER: ICD-10-CM

## 2025-04-28 PROCEDURE — 73221 MRI JOINT UPR EXTREM W/O DYE: CPT

## 2025-05-23 ENCOUNTER — HOSPITAL ENCOUNTER (EMERGENCY)
Age: 45
Discharge: HOME | End: 2025-05-23
Attending: STUDENT IN AN ORGANIZED HEALTH CARE EDUCATION/TRAINING PROGRAM
Payer: COMMERCIAL

## 2025-05-23 VITALS
HEART RATE: 71 BPM | TEMPERATURE: 97.8 F | WEIGHT: 130.7 LBS | OXYGEN SATURATION: 98 % | SYSTOLIC BLOOD PRESSURE: 113 MMHG | BODY MASS INDEX: 22.31 KG/M2 | HEIGHT: 64 IN | DIASTOLIC BLOOD PRESSURE: 84 MMHG | RESPIRATION RATE: 16 BRPM

## 2025-05-23 DIAGNOSIS — R09.81 NASAL CONGESTION: Primary | ICD-10-CM

## 2025-05-23 PROCEDURE — 99282 EMERGENCY DEPT VISIT SF MDM: CPT

## 2025-05-23 RX ORDER — SULFACETAMIDE SODIUM 100 MG/ML
LOTION TOPICAL
COMMUNITY

## 2025-05-23 RX ORDER — BENZOYL PEROXIDE 2.5 G/100G
GEL TOPICAL
COMMUNITY

## 2025-05-23 RX ORDER — ESTRADIOL 0.1 MG/D
1 FILM, EXTENDED RELEASE TRANSDERMAL
COMMUNITY

## 2025-05-23 RX ORDER — CELECOXIB 100 MG/1
100 CAPSULE ORAL 2 TIMES DAILY
COMMUNITY

## 2025-05-23 RX ORDER — SEMAGLUTIDE 1.7 MG/.75ML
1.7 INJECTION, SOLUTION SUBCUTANEOUS
COMMUNITY
Start: 2025-03-06

## 2025-05-23 ASSESSMENT — PAIN DESCRIPTION - DESCRIPTORS: DESCRIPTORS: ACHING

## 2025-05-23 ASSESSMENT — PAIN SCALES - GENERAL: PAINLEVEL_OUTOF10: 4

## 2025-05-23 ASSESSMENT — PAIN DESCRIPTION - LOCATION: LOCATION: FACE;HEAD

## 2025-05-23 NOTE — ED NOTES
Patient reports sinus infection \"for too long\"; approx a week. Advises nasal congestion/runny nose. Denies fever/chills.   Patient asking at triage how long she will have to wait. Advised that the ED has one physician who will be with her as soon as possible but unable to get a timeframe. Patient states that she's here because her child had a dentist appt and is waiting in the car.   
Pt stopped this RN in [facility] stated \"I am not waiting anymore for your [facility] doctor to discharge me. Tell him if he is calling me in meds which he said are over prescribed it needs to go to [facility]. I am not waiting for him to do my discharge and I will be sending a complaint.\" Pt then ambulated out of ER   
Pt wants to leave and does not want to answer anymore questions.

## 2025-06-23 ASSESSMENT — SLEEP AND FATIGUE QUESTIONNAIRES
HOW LIKELY ARE YOU TO NOD OFF OR FALL ASLEEP WHILE SITTING INACTIVE IN A PUBLIC PLACE: SLIGHT CHANCE OF DOZING
HOW LIKELY ARE YOU TO NOD OFF OR FALL ASLEEP WHEN YOU ARE A PASSENGER IN A CAR FOR AN HOUR WITHOUT A BREAK: SLIGHT CHANCE OF DOZING
HOW LIKELY ARE YOU TO NOD OFF OR FALL ASLEEP WHILE LYING DOWN TO REST IN THE AFTERNOON WHEN CIRCUMSTANCES PERMIT: MODERATE CHANCE OF DOZING
HOW LIKELY ARE YOU TO NOD OFF OR FALL ASLEEP WHILE SITTING AND READING: SLIGHT CHANCE OF DOZING
HOW LIKELY ARE YOU TO NOD OFF OR FALL ASLEEP WHILE SITTING QUIETLY AFTER LUNCH WITHOUT ALCOHOL: WOULD NEVER DOZE
HOW LIKELY ARE YOU TO NOD OFF OR FALL ASLEEP WHILE LYING DOWN TO REST IN THE AFTERNOON WHEN CIRCUMSTANCES PERMIT: MODERATE CHANCE OF DOZING
HOW LIKELY ARE YOU TO NOD OFF OR FALL ASLEEP WHILE SITTING QUIETLY AFTER LUNCH WITHOUT ALCOHOL: WOULD NEVER DOZE
HOW LIKELY ARE YOU TO NOD OFF OR FALL ASLEEP WHILE SITTING INACTIVE IN A PUBLIC PLACE: SLIGHT CHANCE OF DOZING
HOW LIKELY ARE YOU TO NOD OFF OR FALL ASLEEP WHILE WATCHING TV: SLIGHT CHANCE OF DOZING
HOW LIKELY ARE YOU TO NOD OFF OR FALL ASLEEP IN A CAR, WHILE STOPPED FOR A FEW MINUTES IN TRAFFIC: WOULD NEVER DOZE
HOW LIKELY ARE YOU TO NOD OFF OR FALL ASLEEP WHILE SITTING AND READING: SLIGHT CHANCE OF DOZING
ESS TOTAL SCORE: 6
HOW LIKELY ARE YOU TO NOD OFF OR FALL ASLEEP WHILE SITTING AND TALKING TO SOMEONE: WOULD NEVER DOZE
HOW LIKELY ARE YOU TO NOD OFF OR FALL ASLEEP WHILE WATCHING TV: SLIGHT CHANCE OF DOZING
HOW LIKELY ARE YOU TO NOD OFF OR FALL ASLEEP WHEN YOU ARE A PASSENGER IN A CAR FOR AN HOUR WITHOUT A BREAK: SLIGHT CHANCE OF DOZING
HOW LIKELY ARE YOU TO NOD OFF OR FALL ASLEEP IN A CAR, WHILE STOPPED FOR A FEW MINUTES IN TRAFFIC: WOULD NEVER DOZE
HOW LIKELY ARE YOU TO NOD OFF OR FALL ASLEEP WHILE SITTING AND TALKING TO SOMEONE: WOULD NEVER DOZE

## 2025-06-24 ENCOUNTER — OFFICE VISIT (OUTPATIENT)
Dept: PULMONOLOGY | Age: 45
End: 2025-06-24
Payer: COMMERCIAL

## 2025-06-24 VITALS
HEART RATE: 68 BPM | OXYGEN SATURATION: 99 % | RESPIRATION RATE: 16 BRPM | TEMPERATURE: 97.9 F | DIASTOLIC BLOOD PRESSURE: 71 MMHG | BODY MASS INDEX: 22.66 KG/M2 | SYSTOLIC BLOOD PRESSURE: 113 MMHG | WEIGHT: 132 LBS

## 2025-06-24 DIAGNOSIS — G47.33 OSA (OBSTRUCTIVE SLEEP APNEA): ICD-10-CM

## 2025-06-24 DIAGNOSIS — G47.10 HYPERSOMNOLENCE: Primary | ICD-10-CM

## 2025-06-24 DIAGNOSIS — Z96.82 S/P INSERTION OF HYPOGLOSSAL NERVE STIMULATOR: ICD-10-CM

## 2025-06-24 PROCEDURE — 99214 OFFICE O/P EST MOD 30 MIN: CPT | Performed by: INTERNAL MEDICINE

## 2025-06-24 RX ORDER — ZOLPIDEM TARTRATE 10 MG/1
TABLET ORAL
COMMUNITY
Start: 2025-03-14

## 2025-06-24 RX ORDER — PROGESTERONE 100 MG/1
CAPSULE ORAL
COMMUNITY
Start: 2025-02-07

## 2025-06-24 RX ORDER — ARMODAFINIL 250 MG/1
TABLET ORAL
Qty: 30 TABLET | Refills: 1 | Status: SHIPPED | OUTPATIENT
Start: 2025-06-24 | End: 2025-09-05

## 2025-06-24 NOTE — PROGRESS NOTES
MA Communication:  The following orders are received by verbal communication from Narciso Murphy MD        Orders include:          Follow up 6-8 qweeks

## 2025-06-24 NOTE — PATIENT INSTRUCTIONS
Nuvigil (armodafinil) can be used for residual daytime sleepiness but there is an interaction with progesterone medications.            Please remember to bring a list of medications and any CPAP or BiPAP machines to your next appointment with the office.     Out of respect for other patients and providers, we ask that you arrive no later than your scheduled appointment time.  If you arrive later than your appointment time, you may be asked to reschedule your appointment.     Late cancellations result in other patients being unable to utilize the appointment slot to see their physician.  Please avoid cancelling your appointment less than 1 week prior to the appointment date.  Patients with multiple missed appointments within 2 years may be dismissed from the practice.     In the next few weeks, you will be receiving a survey from [employer] regarding your visit today.  Your input is valuable to us & surveys are regularly reviewed by [facility] leadership.  It is very important to us that our patients receive excellent care.  If your experience was excellent, please let us know!  If your experience was not a good one, please tell us so we can make needed corrections. We hope you are comfortable recommending us to others for their healthcare needs.     We thank you for choosing [employer]!

## 2025-06-24 NOTE — PROGRESS NOTES
Sleep Medicine Follow-up Note, Jazz     Chief Complaint: DESTINY, Inspire follow up     Interval History 6/24/25  - here for inspire f/u.    2.2 L3, hours are 56.  She uses regularly but still has excessive daytime sleepiness.  She has trouble with sleepiness when working from home.  She lacks energy to play with her son.     Interval history:   here for Inspire f/u 6/27/24  Neurostimulator programming incoming: Amplitude 2.4, level 5, range 2-2.6, delay 60, pause 30, sleep time 8, usage hours 60  She feels she is doing well with the inspire device.  She noticed the benefit.  She does not wake up through the night.    She has relatively severe generalized anxiety disorder and is on clonazepam 0.5 3 times daily.  With this, she has relatively severe sleep onset insomnia.  She was prescribed Ambien 10 mg by her former sleep doctor.  We discussed that with multiple times day dosing Klonopin, I cannot prescribe Ambien.  So far she has had no side effects from Ambien.  No complex sleep behaviors are reported.    Bedtime between 11 and midnight, it varies depending on how long her boyfriend watches television  Fall asleep time around midnight, typically takes around 1 hour to fall asleep, her inspire delay is 1 hour and about once a week it starts firing before she is asleep.  She feels that taking around 1 hour to fall asleep is an improvement that has been the result of Ambien.  She does not wake up through the night.  She does not have a clear sleep routine, she does not have a clear go to sleep time, she does not have a clear bedtime.  She has tried Klonopin, Ambien, antihistamines, melatonin for sleep and she still reports severe insomnia.  She thinks she has been a poor sleeper most of her life.  She drinks Coke 0 throughout the day  He has a 6-year-old child with ADHD that also contributes to her anxiety and sleeplessness.    Presenting history: Alfie patient seen for DESTINY and insomnia  Activation on

## 2025-08-25 ENCOUNTER — TELEMEDICINE (OUTPATIENT)
Dept: PULMONOLOGY | Age: 45
End: 2025-08-25
Payer: COMMERCIAL

## 2025-08-25 DIAGNOSIS — G47.33 OSA (OBSTRUCTIVE SLEEP APNEA): ICD-10-CM

## 2025-08-25 DIAGNOSIS — G47.10 HYPERSOMNOLENCE: Primary | ICD-10-CM

## 2025-08-25 PROCEDURE — 99214 OFFICE O/P EST MOD 30 MIN: CPT | Performed by: INTERNAL MEDICINE

## 2025-08-25 RX ORDER — ARMODAFINIL 250 MG/1
1 TABLET ORAL DAILY
Qty: 90 TABLET | Refills: 1 | Status: SHIPPED | OUTPATIENT
Start: 2025-08-25 | End: 2026-02-21

## 2025-08-25 RX ORDER — PHENTERMINE HYDROCHLORIDE 37.5 MG/1
TABLET ORAL
COMMUNITY

## 2025-08-25 RX ORDER — CHLORHEXIDINE GLUCONATE 40 MG/ML
SOLUTION TOPICAL
COMMUNITY

## 2025-08-25 RX ORDER — METHOCARBAMOL 500 MG/1
TABLET, FILM COATED ORAL
COMMUNITY
Start: 2025-03-20

## 2025-08-25 ASSESSMENT — SLEEP AND FATIGUE QUESTIONNAIRES
HOW LIKELY ARE YOU TO NOD OFF OR FALL ASLEEP IN A CAR, WHILE STOPPED FOR A FEW MINUTES IN TRAFFIC: WOULD NEVER DOZE
ESS TOTAL SCORE: 4
HOW LIKELY ARE YOU TO NOD OFF OR FALL ASLEEP WHILE SITTING AND READING: SLIGHT CHANCE OF DOZING
HOW LIKELY ARE YOU TO NOD OFF OR FALL ASLEEP WHILE WATCHING TV: SLIGHT CHANCE OF DOZING
HOW LIKELY ARE YOU TO NOD OFF OR FALL ASLEEP WHILE LYING DOWN TO REST IN THE AFTERNOON WHEN CIRCUMSTANCES PERMIT: SLIGHT CHANCE OF DOZING
HOW LIKELY ARE YOU TO NOD OFF OR FALL ASLEEP WHEN YOU ARE A PASSENGER IN A CAR FOR AN HOUR WITHOUT A BREAK: WOULD NEVER DOZE
HOW LIKELY ARE YOU TO NOD OFF OR FALL ASLEEP WHILE SITTING AND TALKING TO SOMEONE: WOULD NEVER DOZE
HOW LIKELY ARE YOU TO NOD OFF OR FALL ASLEEP WHILE SITTING INACTIVE IN A PUBLIC PLACE: WOULD NEVER DOZE
HOW LIKELY ARE YOU TO NOD OFF OR FALL ASLEEP WHILE SITTING QUIETLY AFTER LUNCH WITHOUT ALCOHOL: SLIGHT CHANCE OF DOZING

## (undated) DEVICE — REMOTE SLEEP PATIENT CONTROL

## (undated) DEVICE — SUTURE PERMAHAND SZ 2-0 L18IN NONABSORBABLE BLK L26MM SH C012D

## (undated) DEVICE — PACK PROCEDURE SURG ORAL CDS

## (undated) DEVICE — SYRINGE 10 ML POLYCARBINATE PUR

## (undated) DEVICE — SPONGE,DISSECTOR,K,XRAY,9/16"X1/4",STRL: Brand: MEDLINE

## (undated) DEVICE — INTENDED TO BE USED TO OCCLUDE, RETRACT AND IDENTIFY ARTERIES, VEINS, TENDONS AND NERVES IN SURGICAL PROCEDURES: Brand: STERION®  VESSEL LOOP

## (undated) DEVICE — SUTURE PERMA-HAND SZ 3-0 L18IN NONABSORBABLE BLK RB-1 L17MM C053D

## (undated) DEVICE — BLADE ES ELASTOMERIC COAT INSUL DURABLE BEND UPTO 90DEG

## (undated) DEVICE — PROBE 8225401 5PK SD-SD BIPOL STIM ROHS

## (undated) DEVICE — SHEARS ENDOSCP L9CM CRV HARM FOCS +

## (undated) DEVICE — ELECTRODE 8227304 5PK PRASS PR 18MM ROHS

## (undated) DEVICE — 3M™ STERI-DRAPE™ INSTRUMENT POUCH 1018: Brand: STERI-DRAPE™

## (undated) DEVICE — CATHETER ETER IV 20GA L116IN OD1080 1105MM ID0699 0800MM

## (undated) DEVICE — SUTURE VCRL + SZ 3-0 L18IN ABSRB UD SH 1/2 CIR TAPERCUT NDL VCP864D

## (undated) DEVICE — CORD ES L12FT BPLR FRCP

## (undated) DEVICE — TOWEL,OR,DSP,ST,WHITE,DLX,4/PK,20PK/CS: Brand: MEDLINE

## (undated) DEVICE — GLOVE SURG SZ 6 THK91MIL LTX FREE SYN POLYISOPRENE ANTI

## (undated) DEVICE — PENCIL ES L3M BTTN SWCH S STL HEX LOK BLDE ELECTRD HOLSTER

## (undated) DEVICE — ADHESIVE SKIN CLSR 0.7ML TOP DERMBND ADV

## (undated) DEVICE — 3M™ IOBAN™ 2 ANTIMICROBIAL INCISE DRAPE 6650EZ: Brand: IOBAN™ 2

## (undated) DEVICE — SUTURE MCRYL + SZ 4-0 L18IN ABSRB UD L19MM PS-2 3/8 CIR MCP496G

## (undated) DEVICE — CONTAINER,SPECIMEN,OR STERILE,4OZ: Brand: MEDLINE

## (undated) DEVICE — NEEDLE FLTR 19GA L1.5IN WALL THK5UM BRN POLYPR HUB S STL

## (undated) DEVICE — LOOP VES L406MM DIA1MM MAXI BLU SIL RADPQ DISP

## (undated) DEVICE — SYRINGE MED 10ML LUERLOCK TIP W/O SFTY DISP

## (undated) DEVICE — CODMAN® SURGICAL PATTIES 1/2" X 3" (1.27CM X 7.62CM): Brand: CODMAN®

## (undated) DEVICE — 3M™ STERI-DRAPE™ FLUOROSCOPE DRAPE, 10 PER CARTON / 4 CARTONS PER CASE, 1012: Brand: STERI-DRAPE™

## (undated) DEVICE — PROVE COVER: Brand: UNBRANDED